# Patient Record
Sex: FEMALE | Race: WHITE | NOT HISPANIC OR LATINO
[De-identification: names, ages, dates, MRNs, and addresses within clinical notes are randomized per-mention and may not be internally consistent; named-entity substitution may affect disease eponyms.]

---

## 2018-11-11 ENCOUNTER — TRANSCRIPTION ENCOUNTER (OUTPATIENT)
Age: 68
End: 2018-11-11

## 2018-11-12 ENCOUNTER — EMERGENCY (EMERGENCY)
Facility: HOSPITAL | Age: 68
LOS: 1 days | Discharge: HOME | End: 2018-11-12
Attending: EMERGENCY MEDICINE
Payer: COMMERCIAL

## 2018-11-12 VITALS
WEIGHT: 130.07 LBS | HEIGHT: 62 IN | DIASTOLIC BLOOD PRESSURE: 91 MMHG | RESPIRATION RATE: 15 BRPM | SYSTOLIC BLOOD PRESSURE: 175 MMHG | HEART RATE: 75 BPM | TEMPERATURE: 98 F | OXYGEN SATURATION: 97 %

## 2018-11-12 VITALS
DIASTOLIC BLOOD PRESSURE: 82 MMHG | SYSTOLIC BLOOD PRESSURE: 141 MMHG | TEMPERATURE: 98 F | OXYGEN SATURATION: 98 % | HEART RATE: 79 BPM | RESPIRATION RATE: 16 BRPM

## 2018-11-12 PROCEDURE — 99285 EMERGENCY DEPT VISIT HI MDM: CPT | Mod: 25

## 2018-11-12 PROCEDURE — 73140 X-RAY EXAM OF FINGER(S): CPT | Mod: 26,LT

## 2018-11-12 PROCEDURE — 14040 TIS TRNFR F/C/C/M/N/A/G/H/F: CPT

## 2018-11-12 PROCEDURE — 26418 REPAIR FINGER TENDON: CPT

## 2018-11-12 PROCEDURE — 73140 X-RAY EXAM OF FINGER(S): CPT

## 2018-11-12 PROCEDURE — 99284 EMERGENCY DEPT VISIT MOD MDM: CPT

## 2018-11-12 RX ORDER — OXYCODONE AND ACETAMINOPHEN 5; 325 MG/1; MG/1
1 TABLET ORAL ONCE
Qty: 0 | Refills: 0 | Status: DISCONTINUED | OUTPATIENT
Start: 2018-11-12 | End: 2018-11-12

## 2018-11-12 RX ADMIN — Medication 1 TABLET(S): at 14:25

## 2018-11-12 RX ADMIN — OXYCODONE AND ACETAMINOPHEN 1 TABLET(S): 5; 325 TABLET ORAL at 14:25

## 2018-11-12 NOTE — ED PROVIDER NOTE - OBJECTIVE STATEMENT
pt is a 66yo female with pmhx of cad s/p stent on asa c/o left hand laceration. pt reports she cut her left hand dorsal aspect 1st digit at work with box cuter. pt tetanus utd. pt right handed.

## 2018-11-12 NOTE — ED PROVIDER NOTE - MUSCULOSKELETAL, MLM
ms lue: from left hand. pt 1st digit in flexion, can extend 1st digit actively. sensation intact. cap refill < 2 seconds tendon appears partially lacerated.

## 2018-11-12 NOTE — ED PROVIDER NOTE - PROGRESS NOTE DETAILS
pt improved. pt seen and evaluated by dr vo plastics, sutured and splinted by plastics. will rx augmentin and percocet for pain. All questions answered and concerns addressed. pt verbalized understanding and agreement with plan and dx. pt advised to follow up with PMD. pt advised to return to ed for worsenng symptoms including fever, cp, sob. will dc.

## 2018-11-12 NOTE — ED ADULT NURSE NOTE - OBJECTIVE STATEMENT
Pt to ED c/o left hand great thumb laceration, occurred while at work in the proximal thumb, tetanus rec'd in 2015

## 2018-11-12 NOTE — CONSULT NOTE ADULT - SUBJECTIVE AND OBJECTIVE BOX
ALEX BROWN  [id number]  [facility] ED    67yFemale, RHD, presents with laceration to the left thumb.  Patient reports mishandling a  resulting in bleeding and pain.  Patient denies numbness in the hand but reports pain with extension.  Patient denies other injuries.    PMHx/PSHx:  MEWS Score  MI (myocardial infarction)  Hand laceration  No significant past surgical history      No Known Allergies      T(C): 36.7 (11-12-18 @ 14:27), Max: 36.7 (11-12-18 @ 14:27)  HR: 79 (11-12-18 @ 14:27) (75 - 79)  BP: 141/82 (11-12-18 @ 14:27) (141/82 - 175/91)  RR: 16 (11-12-18 @ 14:27) (15 - 16)  SpO2: 98% (11-12-18 @ 14:27) (97% - 98%)  NAD  Left Thumb: 2.2cm laceration on thumb deep to subcutaneous layer with necrotic tissue.  +FPL/+OP/+ EPL with weakness.  Cap refill <3s.  +UDN/+RDN in all digits.  Soft compartments.      Xray:   EXAM:  FINGER(S) LEFT HAND                            PROCEDURE DATE:  11/12/2018          INTERPRETATION:  Clinical information: Hand injury.    Technique: AP, lateral, and oblique views of the left hand.    Comparison: None available.    Findings: No acute fractures or dislocations are noted. The imaged joint   spaces and soft tissues are within normal limits.    IMPRESSION: No acute fractures or dislocations.        Procedure:  Left thumb dorsal radial block, digital block.  Washout of wound with betadine.  Excisional debridement skin to subcutaneous layer.  Excisional debridement of extensor tendon.  Repair of extensor tendon with 4.0 nylon.  Skin flaps widely undermined, advanced, repaired with 4.0 nylon.  Antibotic dressing applied with splint.

## 2018-11-12 NOTE — CONSULT NOTE ADULT - ASSESSMENT
A/P: 66 y/o with laceration s/p repair.  - RUE elevation  - Pain control  - Tetanus  - Abx  - Maintain splint  - F/U 7 days  - Patient educated on warning signs to prompt ER return    Thank You  Vini Hamilton MD  Plastic Surgery  [phone]

## 2020-06-15 ENCOUNTER — EMERGENCY (EMERGENCY)
Facility: HOSPITAL | Age: 70
LOS: 1 days | Discharge: HOME | End: 2020-06-15
Attending: STUDENT IN AN ORGANIZED HEALTH CARE EDUCATION/TRAINING PROGRAM
Payer: COMMERCIAL

## 2020-06-15 VITALS
WEIGHT: 139.99 LBS | OXYGEN SATURATION: 98 % | DIASTOLIC BLOOD PRESSURE: 75 MMHG | RESPIRATION RATE: 19 BRPM | HEIGHT: 62 IN | TEMPERATURE: 98 F | SYSTOLIC BLOOD PRESSURE: 163 MMHG | HEART RATE: 64 BPM

## 2020-06-15 PROCEDURE — 74176 CT ABD & PELVIS W/O CONTRAST: CPT | Mod: 26

## 2020-06-15 PROCEDURE — 99285 EMERGENCY DEPT VISIT HI MDM: CPT

## 2020-06-15 RX ORDER — KETOROLAC TROMETHAMINE 30 MG/ML
15 SYRINGE (ML) INJECTION ONCE
Refills: 0 | Status: DISCONTINUED | OUTPATIENT
Start: 2020-06-15 | End: 2020-06-15

## 2020-06-15 RX ORDER — ONDANSETRON 8 MG/1
4 TABLET, FILM COATED ORAL ONCE
Refills: 0 | Status: COMPLETED | OUTPATIENT
Start: 2020-06-15 | End: 2020-06-15

## 2020-06-15 NOTE — ED PROVIDER NOTE - CLINICAL SUMMARY MEDICAL DECISION MAKING FREE TEXT BOX
pt well appearing, VSS, afebrile, with small kidney stone in which expectant management is appropriate, Will checkl renal function and for signs of infected stone.

## 2020-06-15 NOTE — ED PROVIDER NOTE - NSFOLLOWUPINSTRUCTIONS_ED_ALL_ED_FT
1) Follow up with your doctor within one week  2) Return to the ER for worsening or concerning symptoms    Renal Colic    WHAT YOU NEED TO KNOW:    Renal colic is severe pain in your lower back or sides. The pain is usually on one side, but may be on both sides of your lower back. Renal colic may start quickly, come and go, and become worse over time. Renal colic is caused by a blockage in your urinary tract. The most common cause of a blockage is a kidney stone. Blood clots, ureter spasms, and dead tissue may also block your urinary tract.    DISCHARGE INSTRUCTIONS:    Return to the emergency department if:     You cannot stop vomiting.      You see new or increased bleeding when you urinate.      You are urinating less than usual, or not at all.      Your pain is not getting better even after treatment.    Contact your healthcare provider if:     You have fever.      You need to urinate more often than usual, or right away.      You see a stone in your urine strainer after you urinate.      You have questions or concerns about your condition or care.    Medicines:     Medicines may help decrease pain and muscle spasms. You may also need medicine to calm your stomach and stop vomiting.      Take your medicine as directed. Contact your healthcare provider if you think your medicine is not helping or if you have side effects. Tell him of her if you are allergic to any medicine. Keep a list of the medicines, vitamins, and herbs you take. Include the amounts, and when and why you take them. Bring the list or the pill bottles to follow-up visits. Carry your medicine list with you in case of an emergency.    Manage your symptoms:     Drink liquids as directed to help decrease pain and flush blockages from your urinary tract. Ask how much liquid to drink each day and which liquids are best for you. You may need to drink about 3 liters (12 glasses) of liquids each day. Half of your total daily liquids should be water. Limit coffee, tea, and soda to 2 cups daily. Your urine should be pale and clear.      Strain your urine every time you urinate. Urinate into a strainer (funnel with a fine mesh on the bottom) or glass jar to collect kidney stones. Give the kidney stones to your healthcare provider at your next visit.Look for Stones in the Filter           Eat a variety of healthy foods. Healthy foods include fruits, vegetables, whole-grain breads, low-fat dairy products, beans, lean meats, and fish. You may need to increase the amount of citrus fruit you eat, such as oranges. Ask your healthcare provider how much salt, calcium, and protein you should eat.      Avoid activity in hot temperatures. Heat may cause you to become dehydrated and urinate less.    Follow up with your healthcare provider as directed: You may need to return for tests to check if your blockage has cleared. Write down your questions so you remember to ask them during your visits

## 2020-06-15 NOTE — ED PROVIDER NOTE - OBJECTIVE STATEMENT
70 y/o female with PMHx of MI, and Kidney stones presents to ED c/o flank pain. Patient reports left flank pain, that started about 2 hours ago, that was constant, but has some relief from the pain now. Had 1 episode of vomiting since pain started.     Denies diarrhea, fevers, blood in vomit

## 2020-06-15 NOTE — ED PROVIDER NOTE - CARE PROVIDER_API CALL
Blair Hernandez J  UROLOGY  [address] [place] [address]  Phone: [phone]  Fax: [phone]  Follow Up Time:

## 2020-06-15 NOTE — ED PROVIDER NOTE - PATIENT PORTAL LINK FT
You can access the FollowMyHealth Patient Portal offered by [facility] by registering at the following website: [email or web address]. By joining [facility]’s FollowMyHealth portal, you will also be able to view your health information using other applications (apps) compatible with our system.

## 2020-06-16 VITALS
RESPIRATION RATE: 18 BRPM | OXYGEN SATURATION: 99 % | DIASTOLIC BLOOD PRESSURE: 85 MMHG | SYSTOLIC BLOOD PRESSURE: 174 MMHG | HEART RATE: 65 BPM | TEMPERATURE: 98 F

## 2020-06-16 LAB
ALBUMIN SERPL ELPH-MCNC: 4.1 G/DL — SIGNIFICANT CHANGE UP (ref 3.3–5.2)
ALP SERPL-CCNC: 72 U/L — SIGNIFICANT CHANGE UP (ref 40–120)
ALT FLD-CCNC: 16 U/L — SIGNIFICANT CHANGE UP
ANION GAP SERPL CALC-SCNC: 7 MMOL/L — SIGNIFICANT CHANGE UP (ref 5–17)
APPEARANCE UR: CLEAR — SIGNIFICANT CHANGE UP
AST SERPL-CCNC: 20 U/L — SIGNIFICANT CHANGE UP
BACTERIA # UR AUTO: ABNORMAL
BASOPHILS # BLD AUTO: 0.02 K/UL — SIGNIFICANT CHANGE UP (ref 0–0.2)
BASOPHILS NFR BLD AUTO: 0.2 % — SIGNIFICANT CHANGE UP (ref 0–2)
BILIRUB SERPL-MCNC: 0.3 MG/DL — LOW (ref 0.4–2)
BILIRUB UR-MCNC: NEGATIVE — SIGNIFICANT CHANGE UP
BUN SERPL-MCNC: 20 MG/DL — SIGNIFICANT CHANGE UP (ref 8–20)
CALCIUM SERPL-MCNC: 9 MG/DL — SIGNIFICANT CHANGE UP (ref 8.6–10.2)
CHLORIDE SERPL-SCNC: 108 MMOL/L — HIGH (ref 98–107)
CO2 SERPL-SCNC: 26 MMOL/L — SIGNIFICANT CHANGE UP (ref 22–29)
COLOR SPEC: YELLOW — SIGNIFICANT CHANGE UP
CREAT SERPL-MCNC: 0.63 MG/DL — SIGNIFICANT CHANGE UP (ref 0.5–1.3)
DIFF PNL FLD: ABNORMAL
EOSINOPHIL # BLD AUTO: 0.09 K/UL — SIGNIFICANT CHANGE UP (ref 0–0.5)
EOSINOPHIL NFR BLD AUTO: 1 % — SIGNIFICANT CHANGE UP (ref 0–6)
EPI CELLS # UR: SIGNIFICANT CHANGE UP
GLUCOSE SERPL-MCNC: 111 MG/DL — HIGH (ref 70–99)
GLUCOSE UR QL: NEGATIVE MG/DL — SIGNIFICANT CHANGE UP
HCT VFR BLD CALC: 40.6 % — SIGNIFICANT CHANGE UP (ref 34.5–45)
HGB BLD-MCNC: 13.4 G/DL — SIGNIFICANT CHANGE UP (ref 11.5–15.5)
IMM GRANULOCYTES NFR BLD AUTO: 0.2 % — SIGNIFICANT CHANGE UP (ref 0–1.5)
KETONES UR-MCNC: NEGATIVE — SIGNIFICANT CHANGE UP
LEUKOCYTE ESTERASE UR-ACNC: ABNORMAL
LYMPHOCYTES # BLD AUTO: 0.98 K/UL — LOW (ref 1–3.3)
LYMPHOCYTES # BLD AUTO: 11.1 % — LOW (ref 13–44)
MCHC RBC-ENTMCNC: 31.8 PG — SIGNIFICANT CHANGE UP (ref 27–34)
MCHC RBC-ENTMCNC: 33 GM/DL — SIGNIFICANT CHANGE UP (ref 32–36)
MCV RBC AUTO: 96.2 FL — SIGNIFICANT CHANGE UP (ref 80–100)
MONOCYTES # BLD AUTO: 0.47 K/UL — SIGNIFICANT CHANGE UP (ref 0–0.9)
MONOCYTES NFR BLD AUTO: 5.3 % — SIGNIFICANT CHANGE UP (ref 2–14)
NEUTROPHILS # BLD AUTO: 7.24 K/UL — SIGNIFICANT CHANGE UP (ref 1.8–7.4)
NEUTROPHILS NFR BLD AUTO: 82.2 % — HIGH (ref 43–77)
NITRITE UR-MCNC: NEGATIVE — SIGNIFICANT CHANGE UP
PH UR: 6.5 — SIGNIFICANT CHANGE UP (ref 5–8)
PLATELET # BLD AUTO: 151 K/UL — SIGNIFICANT CHANGE UP (ref 150–400)
POTASSIUM SERPL-MCNC: 3.6 MMOL/L — SIGNIFICANT CHANGE UP (ref 3.5–5.3)
POTASSIUM SERPL-SCNC: 3.6 MMOL/L — SIGNIFICANT CHANGE UP (ref 3.5–5.3)
PROT SERPL-MCNC: 6.5 G/DL — LOW (ref 6.6–8.7)
PROT UR-MCNC: 30 MG/DL
RBC # BLD: 4.22 M/UL — SIGNIFICANT CHANGE UP (ref 3.8–5.2)
RBC # FLD: 12.4 % — SIGNIFICANT CHANGE UP (ref 10.3–14.5)
RBC CASTS # UR COMP ASSIST: ABNORMAL /HPF (ref 0–4)
SODIUM SERPL-SCNC: 141 MMOL/L — SIGNIFICANT CHANGE UP (ref 135–145)
SP GR SPEC: 1.01 — SIGNIFICANT CHANGE UP (ref 1.01–1.02)
UROBILINOGEN FLD QL: NEGATIVE MG/DL — SIGNIFICANT CHANGE UP
WBC # BLD: 8.82 K/UL — SIGNIFICANT CHANGE UP (ref 3.8–10.5)
WBC # FLD AUTO: 8.82 K/UL — SIGNIFICANT CHANGE UP (ref 3.8–10.5)
WBC UR QL: SIGNIFICANT CHANGE UP

## 2020-06-16 PROCEDURE — 74176 CT ABD & PELVIS W/O CONTRAST: CPT

## 2020-06-16 PROCEDURE — 93010 ELECTROCARDIOGRAM REPORT: CPT

## 2020-06-16 PROCEDURE — 93005 ELECTROCARDIOGRAM TRACING: CPT

## 2020-06-16 PROCEDURE — 99284 EMERGENCY DEPT VISIT MOD MDM: CPT | Mod: 25

## 2020-06-16 PROCEDURE — 85027 COMPLETE CBC AUTOMATED: CPT

## 2020-06-16 PROCEDURE — 36415 COLL VENOUS BLD VENIPUNCTURE: CPT

## 2020-06-16 PROCEDURE — 84484 ASSAY OF TROPONIN QUANT: CPT

## 2020-06-16 PROCEDURE — 96374 THER/PROPH/DIAG INJ IV PUSH: CPT

## 2020-06-16 PROCEDURE — 80053 COMPREHEN METABOLIC PANEL: CPT

## 2020-06-16 PROCEDURE — 81001 URINALYSIS AUTO W/SCOPE: CPT

## 2020-06-16 RX ORDER — TAMSULOSIN HYDROCHLORIDE 0.4 MG/1
1 CAPSULE ORAL
Qty: 10 | Refills: 0
Start: 2020-06-16 | End: 2020-06-25

## 2020-06-16 RX ORDER — IBUPROFEN 200 MG
1 TABLET ORAL
Qty: 20 | Refills: 0
Start: 2020-06-16 | End: 2020-06-20

## 2020-06-16 RX ADMIN — Medication 15 MILLIGRAM(S): at 00:12

## 2020-06-16 NOTE — ED ADULT NURSE NOTE - CHIEF COMPLAINT QUOTE
Patient presented to ed secondary to left flank pain. AS per patient she has HX of renal stones had experience same pain in past. Denies blood in urine and any trauma to effected area.

## 2020-06-16 NOTE — ED ADULT NURSE NOTE - OBJECTIVE STATEMENT
patient presents with c/o left side flank pain getting worse over the last few hours, accompanied by 1 episode of nausea/vomitng at home PTA. hx of kidney stones. denies blood in urine, diff. urinating or pain with urination. no fever/chills, abd pain, chest pain, cough, sob.

## 2020-08-05 ENCOUNTER — EMERGENCY (EMERGENCY)
Facility: HOSPITAL | Age: 70
LOS: 1 days | End: 2020-08-05
Attending: STUDENT IN AN ORGANIZED HEALTH CARE EDUCATION/TRAINING PROGRAM
Payer: COMMERCIAL

## 2020-08-05 VITALS
OXYGEN SATURATION: 98 % | RESPIRATION RATE: 14 BRPM | HEART RATE: 74 BPM | WEIGHT: 149.91 LBS | DIASTOLIC BLOOD PRESSURE: 86 MMHG | TEMPERATURE: 98 F | SYSTOLIC BLOOD PRESSURE: 137 MMHG

## 2020-08-05 VITALS
SYSTOLIC BLOOD PRESSURE: 155 MMHG | TEMPERATURE: 98 F | OXYGEN SATURATION: 97 % | HEART RATE: 72 BPM | RESPIRATION RATE: 17 BRPM | DIASTOLIC BLOOD PRESSURE: 79 MMHG

## 2020-08-05 PROBLEM — N20.0 CALCULUS OF KIDNEY: Chronic | Status: ACTIVE | Noted: 2020-06-16

## 2020-08-05 LAB
ALBUMIN SERPL ELPH-MCNC: 4.1 G/DL — SIGNIFICANT CHANGE UP (ref 3.3–5)
ALP SERPL-CCNC: 74 U/L — SIGNIFICANT CHANGE UP (ref 40–120)
ALT FLD-CCNC: 20 U/L — SIGNIFICANT CHANGE UP (ref 12–78)
ANION GAP SERPL CALC-SCNC: 4 MMOL/L — LOW (ref 5–17)
APPEARANCE UR: CLEAR — SIGNIFICANT CHANGE UP
AST SERPL-CCNC: 19 U/L — SIGNIFICANT CHANGE UP (ref 15–37)
BACTERIA # UR AUTO: ABNORMAL
BASOPHILS # BLD AUTO: 0.01 K/UL — SIGNIFICANT CHANGE UP (ref 0–0.2)
BASOPHILS NFR BLD AUTO: 0.1 % — SIGNIFICANT CHANGE UP (ref 0–2)
BILIRUB SERPL-MCNC: 0.7 MG/DL — SIGNIFICANT CHANGE UP (ref 0.2–1.2)
BILIRUB UR-MCNC: NEGATIVE — SIGNIFICANT CHANGE UP
BUN SERPL-MCNC: 21 MG/DL — SIGNIFICANT CHANGE UP (ref 7–23)
CALCIUM SERPL-MCNC: 9.1 MG/DL — SIGNIFICANT CHANGE UP (ref 8.5–10.1)
CHLORIDE SERPL-SCNC: 112 MMOL/L — HIGH (ref 96–108)
CO2 SERPL-SCNC: 27 MMOL/L — SIGNIFICANT CHANGE UP (ref 22–31)
COLOR SPEC: SIGNIFICANT CHANGE UP
CREAT SERPL-MCNC: 0.91 MG/DL — SIGNIFICANT CHANGE UP (ref 0.5–1.3)
DIFF PNL FLD: ABNORMAL
EOSINOPHIL # BLD AUTO: 0.02 K/UL — SIGNIFICANT CHANGE UP (ref 0–0.5)
EOSINOPHIL NFR BLD AUTO: 0.3 % — SIGNIFICANT CHANGE UP (ref 0–6)
EPI CELLS # UR: SIGNIFICANT CHANGE UP
GLUCOSE SERPL-MCNC: 116 MG/DL — HIGH (ref 70–99)
GLUCOSE UR QL: NEGATIVE — SIGNIFICANT CHANGE UP
HCT VFR BLD CALC: 39 % — SIGNIFICANT CHANGE UP (ref 34.5–45)
HGB BLD-MCNC: 13.4 G/DL — SIGNIFICANT CHANGE UP (ref 11.5–15.5)
IMM GRANULOCYTES NFR BLD AUTO: 0.3 % — SIGNIFICANT CHANGE UP (ref 0–1.5)
KETONES UR-MCNC: NEGATIVE — SIGNIFICANT CHANGE UP
LEUKOCYTE ESTERASE UR-ACNC: NEGATIVE — SIGNIFICANT CHANGE UP
LYMPHOCYTES # BLD AUTO: 0.56 K/UL — LOW (ref 1–3.3)
LYMPHOCYTES # BLD AUTO: 7.1 % — LOW (ref 13–44)
MANUAL SMEAR VERIFICATION: SIGNIFICANT CHANGE UP
MCHC RBC-ENTMCNC: 31.1 PG — SIGNIFICANT CHANGE UP (ref 27–34)
MCHC RBC-ENTMCNC: 34.4 GM/DL — SIGNIFICANT CHANGE UP (ref 32–36)
MCV RBC AUTO: 90.5 FL — SIGNIFICANT CHANGE UP (ref 80–100)
MONOCYTES # BLD AUTO: 0.28 K/UL — SIGNIFICANT CHANGE UP (ref 0–0.9)
MONOCYTES NFR BLD AUTO: 3.6 % — SIGNIFICANT CHANGE UP (ref 2–14)
NEUTROPHILS # BLD AUTO: 6.96 K/UL — SIGNIFICANT CHANGE UP (ref 1.8–7.4)
NEUTROPHILS NFR BLD AUTO: 88.6 % — HIGH (ref 43–77)
NITRITE UR-MCNC: NEGATIVE — SIGNIFICANT CHANGE UP
NRBC # BLD: 0 /100 WBCS — SIGNIFICANT CHANGE UP (ref 0–0)
PH UR: 8 — SIGNIFICANT CHANGE UP (ref 5–8)
PLAT MORPH BLD: NORMAL — SIGNIFICANT CHANGE UP
PLATELET # BLD AUTO: 121 K/UL — LOW (ref 150–400)
POTASSIUM SERPL-MCNC: 4.2 MMOL/L — SIGNIFICANT CHANGE UP (ref 3.5–5.3)
POTASSIUM SERPL-SCNC: 4.2 MMOL/L — SIGNIFICANT CHANGE UP (ref 3.5–5.3)
PROT SERPL-MCNC: 7.4 G/DL — SIGNIFICANT CHANGE UP (ref 6–8.3)
PROT UR-MCNC: NEGATIVE — SIGNIFICANT CHANGE UP
RBC # BLD: 4.31 M/UL — SIGNIFICANT CHANGE UP (ref 3.8–5.2)
RBC # FLD: 12.2 % — SIGNIFICANT CHANGE UP (ref 10.3–14.5)
RBC BLD AUTO: SIGNIFICANT CHANGE UP
RBC CASTS # UR COMP ASSIST: SIGNIFICANT CHANGE UP /HPF (ref 0–4)
SODIUM SERPL-SCNC: 143 MMOL/L — SIGNIFICANT CHANGE UP (ref 135–145)
SP GR SPEC: 1.01 — SIGNIFICANT CHANGE UP (ref 1.01–1.02)
UROBILINOGEN FLD QL: NEGATIVE — SIGNIFICANT CHANGE UP
WBC # BLD: 7.85 K/UL — SIGNIFICANT CHANGE UP (ref 3.8–10.5)
WBC # FLD AUTO: 7.85 K/UL — SIGNIFICANT CHANGE UP (ref 3.8–10.5)
WBC UR QL: SIGNIFICANT CHANGE UP

## 2020-08-05 PROCEDURE — 85027 COMPLETE CBC AUTOMATED: CPT

## 2020-08-05 PROCEDURE — 87086 URINE CULTURE/COLONY COUNT: CPT

## 2020-08-05 PROCEDURE — 96374 THER/PROPH/DIAG INJ IV PUSH: CPT | Mod: XU

## 2020-08-05 PROCEDURE — 74177 CT ABD & PELVIS W/CONTRAST: CPT

## 2020-08-05 PROCEDURE — 36415 COLL VENOUS BLD VENIPUNCTURE: CPT

## 2020-08-05 PROCEDURE — 80053 COMPREHEN METABOLIC PANEL: CPT

## 2020-08-05 PROCEDURE — 99284 EMERGENCY DEPT VISIT MOD MDM: CPT

## 2020-08-05 PROCEDURE — 99284 EMERGENCY DEPT VISIT MOD MDM: CPT | Mod: 25

## 2020-08-05 PROCEDURE — 74177 CT ABD & PELVIS W/CONTRAST: CPT | Mod: 26

## 2020-08-05 PROCEDURE — 96375 TX/PRO/DX INJ NEW DRUG ADDON: CPT

## 2020-08-05 PROCEDURE — 81001 URINALYSIS AUTO W/SCOPE: CPT

## 2020-08-05 RX ORDER — ONDANSETRON 8 MG/1
4 TABLET, FILM COATED ORAL ONCE
Refills: 0 | Status: COMPLETED | OUTPATIENT
Start: 2020-08-05 | End: 2020-08-05

## 2020-08-05 RX ORDER — CEFUROXIME AXETIL 250 MG
1 TABLET ORAL
Qty: 14 | Refills: 0
Start: 2020-08-05 | End: 2020-08-11

## 2020-08-05 RX ORDER — SODIUM CHLORIDE 9 MG/ML
1000 INJECTION INTRAMUSCULAR; INTRAVENOUS; SUBCUTANEOUS ONCE
Refills: 0 | Status: COMPLETED | OUTPATIENT
Start: 2020-08-05 | End: 2020-08-05

## 2020-08-05 RX ORDER — MORPHINE SULFATE 50 MG/1
2 CAPSULE, EXTENDED RELEASE ORAL ONCE
Refills: 0 | Status: DISCONTINUED | OUTPATIENT
Start: 2020-08-05 | End: 2020-08-05

## 2020-08-05 RX ORDER — KETOROLAC TROMETHAMINE 30 MG/ML
15 SYRINGE (ML) INJECTION ONCE
Refills: 0 | Status: DISCONTINUED | OUTPATIENT
Start: 2020-08-05 | End: 2020-08-05

## 2020-08-05 RX ADMIN — SODIUM CHLORIDE 1000 MILLILITER(S): 9 INJECTION INTRAMUSCULAR; INTRAVENOUS; SUBCUTANEOUS at 12:25

## 2020-08-05 RX ADMIN — MORPHINE SULFATE 2 MILLIGRAM(S): 50 CAPSULE, EXTENDED RELEASE ORAL at 11:40

## 2020-08-05 RX ADMIN — MORPHINE SULFATE 2 MILLIGRAM(S): 50 CAPSULE, EXTENDED RELEASE ORAL at 11:25

## 2020-08-05 RX ADMIN — SODIUM CHLORIDE 1000 MILLILITER(S): 9 INJECTION INTRAMUSCULAR; INTRAVENOUS; SUBCUTANEOUS at 11:25

## 2020-08-05 RX ADMIN — Medication 15 MILLIGRAM(S): at 13:47

## 2020-08-05 RX ADMIN — ONDANSETRON 4 MILLIGRAM(S): 8 TABLET, FILM COATED ORAL at 11:25

## 2020-08-05 NOTE — ED PROVIDER NOTE - PHYSICAL EXAMINATION
Gen: Well appearing in NAD  Head: NC/AT  Neck: trachea midline  Resp:  No distress  abd: + llq tenderness  no CVAT  Ext: no deformities  Neuro:  A&O appears non focal  Skin:  Warm and dry as visualized  Psych:  Normal affect and mood

## 2020-08-05 NOTE — ED PROVIDER NOTE - OBJECTIVE STATEMENT
68 y/o female with PMHx of MI, and Kidney stones presents to ED c/o llq abd pain, started this morning, worse than her prior stone, but does not recall where her pain was in prior stone. + nausea and vomiting, no fevers. no dairrhea, no urinary sx

## 2020-08-05 NOTE — ED PROVIDER NOTE - NSFOLLOWUPINSTRUCTIONS_ED_ALL_ED_FT
1. TAKE ALL MEDICATIONS AS DIRECTED.    2. FOR PAIN OR FEVER YOU CAN TAKE IBUPROFEN (MOTRIN, ADVIL) OR ACETAMINOPHEN (TYLENOL) AS NEEDED, AS DIRECTED ON PACKAGING.  3. FOLLOW UP WITH YOUR PRIMARY DOCTOR WITHIN 5 DAYS AS DIRECTED.  4. IF YOU HAD LABS OR IMAGING DONE, YOU WERE GIVEN COPIES OF ALL LABS AND/OR IMAGING RESULTS FROM YOUR ER VISIT--PLEASE TAKE THEM WITH YOU TO YOUR FOLLOW UP APPOINTMENTS.  5. IF NEEDED, CALL PATIENT ACCESS SERVICES AT [phone] TO FIND A PRIMARY CARE PHYSICIAN.  OR CALL [phone] TO MAKE AN APPOINTMENT WITH THE CLINIC.  6. RETURN TO THE ER FOR ANY WORSENING SYMPTOMS OR CONCERNS.    FOllow up with urology. Take antibiotics twice a day as directed, return for fevers, or worsening symtpoms   Please see your PCP regarding pulmonary nodules.

## 2020-08-05 NOTE — ED PROVIDER NOTE - CLINICAL SUMMARY MEDICAL DECISION MAKING FREE TEXT BOX
68yo F with llq pain, ct with 3mm distal stone and hydro, no UTI, willd cw tih uro follow up and ceftin

## 2020-08-05 NOTE — ED PROVIDER NOTE - PATIENT PORTAL LINK FT
Called pateint about labs - all 'rheum' labs were nml x small elevation of CRP. I explained that this is very non specific. She is c/o pain in multiple joints and ankle. I suggest a rheumatology consult.   You can access the FollowMyHealth Patient Portal offered by [facility] by registering at the following website: [email or web address]. By joining [facility]’s FollowMyHealth portal, you will also be able to view your health information using other applications (apps) compatible with our system.

## 2020-08-05 NOTE — ED PROVIDER NOTE - CARE PROVIDER_API CALL
Jb Sapp  UROLOGY  [address] [place] [address]  Phone: [phone]  Fax: [phone]  Follow Up Time: 4-6 Days

## 2020-08-05 NOTE — ED ADULT NURSE NOTE - OBJECTIVE STATEMENT
patient states she has been feeling stabbing abdominal pain to the left lower quadrant. patient states she has been feeling stabbing abdominal pain to the left lower quadrant since last night.  patient states she was sweating this morning and is unsure if she had a fever.  patient states she vomited this morning/early afternoon

## 2020-08-06 ENCOUNTER — INPATIENT (INPATIENT)
Facility: HOSPITAL | Age: 70
LOS: 0 days | Discharge: HOME | DRG: 884 | End: 2020-08-07
Attending: FAMILY MEDICINE | Admitting: HOSPITALIST
Payer: COMMERCIAL

## 2020-08-06 VITALS
SYSTOLIC BLOOD PRESSURE: 177 MMHG | HEART RATE: 73 BPM | RESPIRATION RATE: 18 BRPM | HEIGHT: 65 IN | WEIGHT: 130.07 LBS | TEMPERATURE: 98 F | OXYGEN SATURATION: 97 % | DIASTOLIC BLOOD PRESSURE: 83 MMHG

## 2020-08-06 DIAGNOSIS — R47.01 APHASIA: ICD-10-CM

## 2020-08-06 LAB
ALBUMIN SERPL ELPH-MCNC: 3.7 G/DL — SIGNIFICANT CHANGE UP (ref 3.3–5)
ALP SERPL-CCNC: 70 U/L — SIGNIFICANT CHANGE UP (ref 40–120)
ALT FLD-CCNC: 19 U/L — SIGNIFICANT CHANGE UP (ref 12–78)
ANION GAP SERPL CALC-SCNC: 3 MMOL/L — LOW (ref 5–17)
APPEARANCE UR: CLEAR — SIGNIFICANT CHANGE UP
APTT BLD: 32.3 SEC — SIGNIFICANT CHANGE UP (ref 27.5–35.5)
AST SERPL-CCNC: 20 U/L — SIGNIFICANT CHANGE UP (ref 15–37)
BASOPHILS # BLD AUTO: 0.01 K/UL — SIGNIFICANT CHANGE UP (ref 0–0.2)
BASOPHILS NFR BLD AUTO: 0.2 % — SIGNIFICANT CHANGE UP (ref 0–2)
BILIRUB SERPL-MCNC: 0.6 MG/DL — SIGNIFICANT CHANGE UP (ref 0.2–1.2)
BILIRUB UR-MCNC: NEGATIVE — SIGNIFICANT CHANGE UP
BUN SERPL-MCNC: 17 MG/DL — SIGNIFICANT CHANGE UP (ref 7–23)
CALCIUM SERPL-MCNC: 9.1 MG/DL — SIGNIFICANT CHANGE UP (ref 8.5–10.1)
CHLORIDE SERPL-SCNC: 110 MMOL/L — HIGH (ref 96–108)
CO2 SERPL-SCNC: 30 MMOL/L — SIGNIFICANT CHANGE UP (ref 22–31)
COLOR SPEC: YELLOW — SIGNIFICANT CHANGE UP
CREAT SERPL-MCNC: 0.66 MG/DL — SIGNIFICANT CHANGE UP (ref 0.5–1.3)
CULTURE RESULTS: SIGNIFICANT CHANGE UP
DIFF PNL FLD: ABNORMAL
EOSINOPHIL # BLD AUTO: 0.12 K/UL — SIGNIFICANT CHANGE UP (ref 0–0.5)
EOSINOPHIL NFR BLD AUTO: 2.3 % — SIGNIFICANT CHANGE UP (ref 0–6)
GLUCOSE SERPL-MCNC: 143 MG/DL — HIGH (ref 70–99)
GLUCOSE UR QL: NEGATIVE — SIGNIFICANT CHANGE UP
HCT VFR BLD CALC: 36.9 % — SIGNIFICANT CHANGE UP (ref 34.5–45)
HGB BLD-MCNC: 12.3 G/DL — SIGNIFICANT CHANGE UP (ref 11.5–15.5)
IMM GRANULOCYTES NFR BLD AUTO: 0.2 % — SIGNIFICANT CHANGE UP (ref 0–1.5)
INR BLD: 1.05 RATIO — SIGNIFICANT CHANGE UP (ref 0.88–1.16)
KETONES UR-MCNC: NEGATIVE — SIGNIFICANT CHANGE UP
LEUKOCYTE ESTERASE UR-ACNC: ABNORMAL
LYMPHOCYTES # BLD AUTO: 1.26 K/UL — SIGNIFICANT CHANGE UP (ref 1–3.3)
LYMPHOCYTES # BLD AUTO: 24.1 % — SIGNIFICANT CHANGE UP (ref 13–44)
MCHC RBC-ENTMCNC: 31.1 PG — SIGNIFICANT CHANGE UP (ref 27–34)
MCHC RBC-ENTMCNC: 33.3 GM/DL — SIGNIFICANT CHANGE UP (ref 32–36)
MCV RBC AUTO: 93.4 FL — SIGNIFICANT CHANGE UP (ref 80–100)
MONOCYTES # BLD AUTO: 0.38 K/UL — SIGNIFICANT CHANGE UP (ref 0–0.9)
MONOCYTES NFR BLD AUTO: 7.3 % — SIGNIFICANT CHANGE UP (ref 2–14)
NEUTROPHILS # BLD AUTO: 3.44 K/UL — SIGNIFICANT CHANGE UP (ref 1.8–7.4)
NEUTROPHILS NFR BLD AUTO: 65.9 % — SIGNIFICANT CHANGE UP (ref 43–77)
NITRITE UR-MCNC: NEGATIVE — SIGNIFICANT CHANGE UP
NRBC # BLD: 0 /100 WBCS — SIGNIFICANT CHANGE UP (ref 0–0)
PH UR: 5 — SIGNIFICANT CHANGE UP (ref 5–8)
PLATELET # BLD AUTO: 125 K/UL — LOW (ref 150–400)
POTASSIUM SERPL-MCNC: 4 MMOL/L — SIGNIFICANT CHANGE UP (ref 3.5–5.3)
POTASSIUM SERPL-SCNC: 4 MMOL/L — SIGNIFICANT CHANGE UP (ref 3.5–5.3)
PROT SERPL-MCNC: 7.1 G/DL — SIGNIFICANT CHANGE UP (ref 6–8.3)
PROT UR-MCNC: NEGATIVE — SIGNIFICANT CHANGE UP
PROTHROM AB SERPL-ACNC: 12.3 SEC — SIGNIFICANT CHANGE UP (ref 10.6–13.6)
RBC # BLD: 3.95 M/UL — SIGNIFICANT CHANGE UP (ref 3.8–5.2)
RBC # FLD: 12.4 % — SIGNIFICANT CHANGE UP (ref 10.3–14.5)
SODIUM SERPL-SCNC: 143 MMOL/L — SIGNIFICANT CHANGE UP (ref 135–145)
SP GR SPEC: 1.01 — SIGNIFICANT CHANGE UP (ref 1.01–1.02)
SPECIMEN SOURCE: SIGNIFICANT CHANGE UP
TROPONIN I SERPL-MCNC: <.015 NG/ML — SIGNIFICANT CHANGE UP (ref 0.01–0.04)
UROBILINOGEN FLD QL: NEGATIVE — SIGNIFICANT CHANGE UP
WBC # BLD: 5.22 K/UL — SIGNIFICANT CHANGE UP (ref 3.8–10.5)
WBC # FLD AUTO: 5.22 K/UL — SIGNIFICANT CHANGE UP (ref 3.8–10.5)

## 2020-08-06 PROCEDURE — 93010 ELECTROCARDIOGRAM REPORT: CPT

## 2020-08-06 PROCEDURE — 99285 EMERGENCY DEPT VISIT HI MDM: CPT

## 2020-08-06 PROCEDURE — 70498 CT ANGIOGRAPHY NECK: CPT | Mod: 26

## 2020-08-06 PROCEDURE — 99223 1ST HOSP IP/OBS HIGH 75: CPT | Mod: GC

## 2020-08-06 PROCEDURE — 70496 CT ANGIOGRAPHY HEAD: CPT | Mod: 26

## 2020-08-06 RX ORDER — ASPIRIN/CALCIUM CARB/MAGNESIUM 324 MG
325 TABLET ORAL ONCE
Refills: 0 | Status: COMPLETED | OUTPATIENT
Start: 2020-08-06 | End: 2020-08-06

## 2020-08-06 RX ADMIN — Medication 325 MILLIGRAM(S): at 23:42

## 2020-08-06 NOTE — ED PROVIDER NOTE - OBJECTIVE STATEMENT
68yo woman pw episode of AMS today while on the phone with her PMD to ask a question. she was here in the ED yesterday for stone and treated for UTI with abx in addition.  son at bedside states she intermittently has episodes of aphasia and asking unrelated questions, appearing confused but has never had a workup for those symptoms.  pt aao x3 here at this time, states she feels well, without complaint. no HA.  denies weakness, numbness.

## 2020-08-06 NOTE — ED PROVIDER NOTE - CLINICAL SUMMARY MEDICAL DECISION MAKING FREE TEXT BOX
intermittent episodes of aphasia and confusion, back to baseline her in ED. workup in ED benign.  admit to medicine for further eval.

## 2020-08-06 NOTE — H&P ADULT - PROBLEM SELECTOR PLAN 4
Remote history of MI, Home medications include ASA 81 mg , Metoprolol , Ramipril and Crestor 20   - In setting of possible CVA, increased ASA to 325 mg   - Ordered Lisinopril 20  (therapeutic interchange) for Ramipril  - Ordered Atorvastatin 80 (therapeutic interchange) for Crestor 20  - Metoprolol on hold for Sinus Bradycardia

## 2020-08-06 NOTE — H&P ADULT - PROBLEM SELECTOR PLAN 1
- Rule out CVA for "Aphasia" vs worsening Dementia, unclear chronicity of symptoms, per family 2 years, per son PCP thought aphasia was an acute change in mental status  -  given as patient is on ASA 81 mg at home  - Patient on high dose statin, continue formulary atorvastatin 80  - CT head negative for hemorrhage or mass or midline shift, CT angio : Negative for meaningful stenosis  - Mini Cog exam total : 0 , at this score <3 , likely element of dementia   - Follow up A1c and lipid panel  - Follow up B12 level, TSH  - Monitor on telemetry for arrythmia  - TTE ordered for the AM  - Passed Dysphagia screen with nursing, on DASH diet  - ordered Speech and Swallow for AM , PT ordered for AM  - Consulted Neuro: Dr. Beavers

## 2020-08-06 NOTE — ED ADULT NURSE NOTE - CHIEF COMPLAINT QUOTE
a/ox2 Patient seen here for +kidney stone two days ago and prescribed antiobiotic - MD Galan called stating shes experiencing altered mental status at this time and he would like her to see neuro. Per MD, may be related to antibiotic that was prescribed.

## 2020-08-06 NOTE — H&P ADULT - NSHPPHYSICALEXAM_GEN_ALL_CORE
T(C): 36.8 (08-07-20 @ 01:12), Max: 37 (08-06-20 @ 20:49)  HR: 56 (08-07-20 @ 01:12) (56 - 73)  BP: 169/75 (08-07-20 @ 01:12) (163/81 - 177/83)  RR: 16 (08-07-20 @ 01:12) (16 - 18)  SpO2: 99% (08-07-20 @ 01:12) (96% - 99%)    GENERAL: patient appears well, no acute distress, appropriate, pleasant  EYES: sclera clear, no exudates  ENMT: oropharynx clear without erythema, no exudates, moist mucous membranes  LUNGS: clear to auscultation, symmetric breath sounds, no wheezing or rhonchi appreciated  HEART: S1/S2, regular rate and rhythm, no murmurs noted, no lower extremity edema  GASTROINTESTINAL: abdomen is soft, nontender, nondistended, normoactive bowel sounds  INTEGUMENT: good skin turgor, warm , dry intact  MUSCULOSKELETAL: no clubbing or cyanosis, no obvious deformity  NEUROLOGIC: awake, alert, oriented x 2, does not know year or season, hesitant on age, mild aphasia, difficultly with short term memory.  CN intact II-XII. strength 5/5 UE and LE . no sensory deficits, Serial 7's: completed properly. Unable to spell world backwards. Mini Cog exam:  Word recall: 0 points for word recall and 0 points for ability to draw a clock correctly, NIHSS score: 3   PSYCHIATRIC: mood is good, affect is congruent, linear and logical thought process

## 2020-08-06 NOTE — H&P ADULT - ASSESSMENT
68 yo F PMHx hx of Remote Myocardial Infarction s/p PCI x 4, Anxiety (fearful of men), Multiple kidney stones, now presenting sent from PMD's office for aphasia. Admitted for Aphasia, Rule out CVA

## 2020-08-06 NOTE — H&P ADULT - PROBLEM SELECTOR PLAN 3
- Continue tamsulosin 0.4 qhs as CT from yesterday 's ED visit shows kidney stone 3 mm, though back pain has remitted, likely passed the stone  - Finish antibiotic for 2 more days

## 2020-08-06 NOTE — H&P ADULT - NSHPREVIEWOFSYSTEMS_GEN_ALL_CORE
CONSTITUTIONAL: denies fever, chills, fatigue, weakness  HEENT: denies blurred vision, sore throat  SKIN: denies new lesions, rash  CARDIOVASCULAR: denies chest pain, chest pressure, palpitations  RESPIRATORY: denies shortness of breath, sputum production  GASTROINTESTINAL: denies nausea, vomiting, diarrhea, abdominal pain  GENITOURINARY: denies dysuria, discharge  NEUROLOGICAL: denies numbness, headache, focal weakness  MUSCULOSKELETAL: denies new joint pain, muscle aches  HEMATOLOGIC: denies gross bleeding, bruising  LYMPHATICS: denies enlarged lymph nodes, extremity swelling  PSYCHIATRIC: denies recent changes in anxiety, depression

## 2020-08-06 NOTE — ED ADULT NURSE NOTE - OBJECTIVE STATEMENT
Pt received in bed alert and oriented and pleasantly confuse and resting in bed. Pt has delayed response, but then answers appropriately. As per pt's son MD Galan called stating that he tried having a conversation with pt and that pt was experiencing altered mental status at the time.  As per pt's son pt was always slightly confuse but that her Md thinks she is more altered and it may be related to antibiotic that was prescribed. As per Md's orders IV kyree placed blood specimen obtained and sent to the lab. Pt stable and nursing care ongoing and safety maintained. Pt's son at bedside.

## 2020-08-06 NOTE — H&P ADULT - PROBLEM SELECTOR PLAN 2
Possibly Medication induced bradycardia due to  Metoprolol succ 100 mg   - Per son, no known history of arrythmia, Prior EKG from June 16 2020 (ED for kidney stone) showed NSR @ 63   - Hold Metoprolol for now  - Monitor bradyarrhythmia on telemetry  - Consulted Cardio; [facility] Dr. Bryant[facility]

## 2020-08-06 NOTE — ED ADULT NURSE NOTE - CHPI ED NUR SYMPTOMS NEG
no loss of consciousness/no vomiting/no weakness/no nausea/no numbness/no blurred vision/no change in level of consciousness/no dizziness/no fever

## 2020-08-06 NOTE — H&P ADULT - HISTORY OF PRESENT ILLNESS
70 yo F PMHx hx of Remote Myocardial Infarction s/p PCI x 4, Anxiety (fearful of men), Multiple kidney stones, now presenting sent from PMD's office for aphasia. Son Cristian at beside. History given mostly by patient. Per son, his mother has had word finding difficulty, unable to finish sentences, and difficulty with executive tasks like word spelling for the past 2 years. He noted last week his nephew asked her how to spell "happy" and she could not tell him. Per son, she was working at [employer] this year without issues until COVID-19 and was furloughed. She lives alone, drives and performs all ADL's independently.     In ED   VS   labs  EKG: Sinus Dwayne 59  Imaging  < from: CT Angio Head w/ IV Cont (08.06.20 @ 20:35) >      1.   Right carotid system:  No hemodynamically significant stenosis.      2.   Left carotid system: No hemodynamically significant stenosis.      3.   Intracranial circulation:  No hemodynamically significant stenosis.      4.   Brain:  No acute infarct or hemorrhage.    Received 68 yo F PMHx hx of Remote Myocardial Infarction s/p PCI x 4, Anxiety (fearful of men), Multiple kidney stones, now presenting sent from PMD's office for aphasia. Son Cristian at beside. History given mostly by patient. Per son, his mother has had word finding difficulty, unable to finish sentences, and difficulty with executive tasks like word spelling for the past 2 years. He noted last week his nephew asked her how to spell "happy" and she could not tell him. Per son, she was working at [employer] this year without issues until COVID-19 and was furloughed. She lives alone, drives and performs all ADL's independently. Denies dizziness, falls, chest pain, shortness of breath, fevers/chills, or feeling ill.     In ED   VS 98.8 HR 56-60 /75 RR  16 SPO2 99 on RA  labs  Platelets 125   EKG: Sinus Dwayne 59  Imaging  < from: CT Angio Head w/ IV Cont (08.06.20 @ 20:35) >      1.   Right carotid system:  No hemodynamically significant stenosis.      2.   Left carotid system: No hemodynamically significant stenosis.      3.   Intracranial circulation:  No hemodynamically significant stenosis.      4.   Brain:  No acute infarct or hemorrhage.    Received

## 2020-08-06 NOTE — ED ADULT NURSE NOTE - NSIMPLEMENTINTERV_GEN_ALL_ED
Implemented All Fall Risk Interventions:  [place] to call system. Call bell, personal items and telephone within reach. Instruct patient to call for assistance. Room bathroom lighting operational. Non-slip footwear when patient is off stretcher. Physically safe environment: no spills, clutter or unnecessary equipment. Stretcher in lowest position, wheels locked, appropriate side rails in place. Provide visual cue, wrist band, yellow gown, etc. Monitor gait and stability. Monitor for mental status changes and reorient to person, place, and time. Review medications for side effects contributing to fall risk. Reinforce activity limits and safety measures with patient and family.

## 2020-08-07 ENCOUNTER — TRANSCRIPTION ENCOUNTER (OUTPATIENT)
Age: 70
End: 2020-08-07

## 2020-08-07 VITALS
TEMPERATURE: 98 F | SYSTOLIC BLOOD PRESSURE: 166 MMHG | DIASTOLIC BLOOD PRESSURE: 80 MMHG | HEART RATE: 70 BPM | RESPIRATION RATE: 18 BRPM | OXYGEN SATURATION: 99 %

## 2020-08-07 DIAGNOSIS — R00.1 BRADYCARDIA, UNSPECIFIED: ICD-10-CM

## 2020-08-07 DIAGNOSIS — I25.2 OLD MYOCARDIAL INFARCTION: ICD-10-CM

## 2020-08-07 DIAGNOSIS — Z29.9 ENCOUNTER FOR PROPHYLACTIC MEASURES, UNSPECIFIED: ICD-10-CM

## 2020-08-07 DIAGNOSIS — N20.0 CALCULUS OF KIDNEY: ICD-10-CM

## 2020-08-07 DIAGNOSIS — R47.01 APHASIA: ICD-10-CM

## 2020-08-07 LAB
A1C WITH ESTIMATED AVERAGE GLUCOSE RESULT: 5.3 % — SIGNIFICANT CHANGE UP (ref 4–5.6)
ANION GAP SERPL CALC-SCNC: 4 MMOL/L — LOW (ref 5–17)
BUN SERPL-MCNC: 15 MG/DL — SIGNIFICANT CHANGE UP (ref 7–23)
CALCIUM SERPL-MCNC: 8.8 MG/DL — SIGNIFICANT CHANGE UP (ref 8.5–10.1)
CHLORIDE SERPL-SCNC: 110 MMOL/L — HIGH (ref 96–108)
CHOLEST SERPL-MCNC: 117 MG/DL — SIGNIFICANT CHANGE UP (ref 10–199)
CO2 SERPL-SCNC: 29 MMOL/L — SIGNIFICANT CHANGE UP (ref 22–31)
CREAT SERPL-MCNC: 0.51 MG/DL — SIGNIFICANT CHANGE UP (ref 0.5–1.3)
ESTIMATED AVERAGE GLUCOSE: 105 MG/DL — SIGNIFICANT CHANGE UP (ref 68–114)
GLUCOSE SERPL-MCNC: 89 MG/DL — SIGNIFICANT CHANGE UP (ref 70–99)
HCT VFR BLD CALC: 35.7 % — SIGNIFICANT CHANGE UP (ref 34.5–45)
HDLC SERPL-MCNC: 31 MG/DL — LOW
HGB BLD-MCNC: 12.2 G/DL — SIGNIFICANT CHANGE UP (ref 11.5–15.5)
LIPID PNL WITH DIRECT LDL SERPL: 73 MG/DL — SIGNIFICANT CHANGE UP
MCHC RBC-ENTMCNC: 31.6 PG — SIGNIFICANT CHANGE UP (ref 27–34)
MCHC RBC-ENTMCNC: 34.2 GM/DL — SIGNIFICANT CHANGE UP (ref 32–36)
MCV RBC AUTO: 92.5 FL — SIGNIFICANT CHANGE UP (ref 80–100)
NRBC # BLD: 0 /100 WBCS — SIGNIFICANT CHANGE UP (ref 0–0)
PLATELET # BLD AUTO: 123 K/UL — LOW (ref 150–400)
POTASSIUM SERPL-MCNC: 3.7 MMOL/L — SIGNIFICANT CHANGE UP (ref 3.5–5.3)
POTASSIUM SERPL-SCNC: 3.7 MMOL/L — SIGNIFICANT CHANGE UP (ref 3.5–5.3)
RBC # BLD: 3.86 M/UL — SIGNIFICANT CHANGE UP (ref 3.8–5.2)
RBC # FLD: 12.6 % — SIGNIFICANT CHANGE UP (ref 10.3–14.5)
SARS-COV-2 RNA SPEC QL NAA+PROBE: SIGNIFICANT CHANGE UP
SODIUM SERPL-SCNC: 143 MMOL/L — SIGNIFICANT CHANGE UP (ref 135–145)
T3FREE SERPL-MCNC: 2.98 PG/ML — SIGNIFICANT CHANGE UP (ref 1.8–4.6)
T4 FREE SERPL-MCNC: 1.2 NG/DL — SIGNIFICANT CHANGE UP (ref 0.9–1.8)
TOTAL CHOLESTEROL/HDL RATIO MEASUREMENT: 3.8 RATIO — SIGNIFICANT CHANGE UP (ref 3.3–7.1)
TRIGL SERPL-MCNC: 68 MG/DL — SIGNIFICANT CHANGE UP (ref 10–149)
TSH SERPL-MCNC: 1.15 UIU/ML — SIGNIFICANT CHANGE UP (ref 0.36–3.74)
VIT B12 SERPL-MCNC: 1734 PG/ML — HIGH (ref 232–1245)
WBC # BLD: 4.51 K/UL — SIGNIFICANT CHANGE UP (ref 3.8–10.5)
WBC # FLD AUTO: 4.51 K/UL — SIGNIFICANT CHANGE UP (ref 3.8–10.5)

## 2020-08-07 PROCEDURE — 70498 CT ANGIOGRAPHY NECK: CPT

## 2020-08-07 PROCEDURE — 81001 URINALYSIS AUTO W/SCOPE: CPT

## 2020-08-07 PROCEDURE — 99222 1ST HOSP IP/OBS MODERATE 55: CPT

## 2020-08-07 PROCEDURE — 82607 VITAMIN B-12: CPT

## 2020-08-07 PROCEDURE — 85610 PROTHROMBIN TIME: CPT

## 2020-08-07 PROCEDURE — 84481 FREE ASSAY (FT-3): CPT

## 2020-08-07 PROCEDURE — 70450 CT HEAD/BRAIN W/O DYE: CPT

## 2020-08-07 PROCEDURE — 80061 LIPID PANEL: CPT

## 2020-08-07 PROCEDURE — 99239 HOSP IP/OBS DSCHRG MGMT >30: CPT | Mod: GC

## 2020-08-07 PROCEDURE — 99285 EMERGENCY DEPT VISIT HI MDM: CPT | Mod: 25

## 2020-08-07 PROCEDURE — 80053 COMPREHEN METABOLIC PANEL: CPT

## 2020-08-07 PROCEDURE — 84425 ASSAY OF VITAMIN B-1: CPT

## 2020-08-07 PROCEDURE — 80048 BASIC METABOLIC PNL TOTAL CA: CPT

## 2020-08-07 PROCEDURE — 93005 ELECTROCARDIOGRAM TRACING: CPT

## 2020-08-07 PROCEDURE — 84443 ASSAY THYROID STIM HORMONE: CPT

## 2020-08-07 PROCEDURE — 87635 SARS-COV-2 COVID-19 AMP PRB: CPT

## 2020-08-07 PROCEDURE — 85027 COMPLETE CBC AUTOMATED: CPT

## 2020-08-07 PROCEDURE — 83036 HEMOGLOBIN GLYCOSYLATED A1C: CPT

## 2020-08-07 PROCEDURE — 70551 MRI BRAIN STEM W/O DYE: CPT | Mod: 26

## 2020-08-07 PROCEDURE — 84484 ASSAY OF TROPONIN QUANT: CPT

## 2020-08-07 PROCEDURE — 92610 EVALUATE SWALLOWING FUNCTION: CPT

## 2020-08-07 PROCEDURE — 36415 COLL VENOUS BLD VENIPUNCTURE: CPT

## 2020-08-07 PROCEDURE — 86769 SARS-COV-2 COVID-19 ANTIBODY: CPT

## 2020-08-07 PROCEDURE — 84439 ASSAY OF FREE THYROXINE: CPT

## 2020-08-07 PROCEDURE — 85730 THROMBOPLASTIN TIME PARTIAL: CPT

## 2020-08-07 PROCEDURE — 70496 CT ANGIOGRAPHY HEAD: CPT

## 2020-08-07 PROCEDURE — 70551 MRI BRAIN STEM W/O DYE: CPT

## 2020-08-07 RX ORDER — RAMIPRIL 5 MG
1 CAPSULE ORAL
Qty: 0 | Refills: 0 | DISCHARGE

## 2020-08-07 RX ORDER — LISINOPRIL 2.5 MG/1
20 TABLET ORAL DAILY
Refills: 0 | Status: DISCONTINUED | OUTPATIENT
Start: 2020-08-07 | End: 2020-08-07

## 2020-08-07 RX ORDER — ENOXAPARIN SODIUM 100 MG/ML
40 INJECTION SUBCUTANEOUS DAILY
Refills: 0 | Status: DISCONTINUED | OUTPATIENT
Start: 2020-08-07 | End: 2020-08-07

## 2020-08-07 RX ORDER — ROSUVASTATIN CALCIUM 5 MG/1
1 TABLET ORAL
Qty: 0 | Refills: 0 | DISCHARGE

## 2020-08-07 RX ORDER — ASPIRIN/CALCIUM CARB/MAGNESIUM 324 MG
1 TABLET ORAL
Qty: 0 | Refills: 0 | DISCHARGE

## 2020-08-07 RX ORDER — ASPIRIN/CALCIUM CARB/MAGNESIUM 324 MG
325 TABLET ORAL DAILY
Refills: 0 | Status: DISCONTINUED | OUTPATIENT
Start: 2020-08-07 | End: 2020-08-07

## 2020-08-07 RX ORDER — TAMSULOSIN HYDROCHLORIDE 0.4 MG/1
0.4 CAPSULE ORAL AT BEDTIME
Refills: 0 | Status: DISCONTINUED | OUTPATIENT
Start: 2020-08-07 | End: 2020-08-07

## 2020-08-07 RX ORDER — ATORVASTATIN CALCIUM 80 MG/1
80 TABLET, FILM COATED ORAL AT BEDTIME
Refills: 0 | Status: DISCONTINUED | OUTPATIENT
Start: 2020-08-07 | End: 2020-08-07

## 2020-08-07 RX ORDER — CEFUROXIME AXETIL 250 MG
250 TABLET ORAL EVERY 12 HOURS
Refills: 0 | Status: DISCONTINUED | OUTPATIENT
Start: 2020-08-07 | End: 2020-08-07

## 2020-08-07 RX ORDER — METOPROLOL TARTRATE 50 MG
1 TABLET ORAL
Qty: 0 | Refills: 0 | DISCHARGE

## 2020-08-07 RX ADMIN — Medication 250 MILLIGRAM(S): at 02:38

## 2020-08-07 RX ADMIN — LISINOPRIL 20 MILLIGRAM(S): 2.5 TABLET ORAL at 05:45

## 2020-08-07 NOTE — DISCHARGE NOTE NURSING/CASE MANAGEMENT/SOCIAL WORK - NSDCPEPTSTRK_GEN_ALL_CORE
Need for follow up after discharge/Prescribed medications/Stroke education booklet/Signs and symptoms of stroke/Risk factors for stroke/Stroke support groups for patients, families, and friends/Stroke warning signs and symptoms/Call 911 for stroke

## 2020-08-07 NOTE — PROGRESS NOTE ADULT - PROBLEM SELECTOR PLAN 5
DVT - Lovenox 40 daily DVT - Lovenox 40 daily    IMPROVE VTE Individual Risk Assessment          RISK                                                          Points  [  ] Previous VTE                                                3  [  ] Thrombophilia                                             2  [  ] Lower limb paralysis                                   2        (unable to hold up >15 seconds)    [  ] Current Cancer                                             2         (within 6 months)  [  ] Immobilization > 24 hrs                              1  [  ] ICU/CCU stay > 24 hours                             1  [ x ] Age > 60                                                         1    IMPROVE VTE Score:         [    1     ]

## 2020-08-07 NOTE — CONSULT NOTE ADULT - ASSESSMENT
68 yo F PMHx hx of Remote Myocardial Infarction s/p PCI x 4, Anxiety (fearful of men), Multiple kidney stones, now presenting sent from PMD's office for aphasia, with sinus bradycardia on EKG likely secondary to metoprolol    #Sinus bradycardia  - Likely secondary to metoprolol  - Hold metoprolol in setting of bradycardia   - Patient is not complaining of any anginal symptoms at this time.  - No clear evidence of acute ischemia, trops negative x 1  - EKG NSR at 63 HR in June 2020  - Monitor on tele  - F/u echo   - No meaningful evidence of volume overload.    # Aphasia, secondary to dementia vs possible aphasia  - Continue Aspirin 325 mg  - continue atorvastatin 80  - f/u neuro recs    #hx of MI  - No anginal symptoms at this time  - Continue aspirin 325 mg  - Continue Lisinopril 20 mg   - Continue Atorvastatin 80 mg      - Monitor and replete lytes, keep K>4, Mg>2.  - Strict I/Os, daily weights.  - Other cardiovascular workup will depend on clinical course.  - All other workup per primary team.  - Will continue to follow. 68 yo F PMHx hx of Remote Myocardial Infarction s/p PCI x 4, Anxiety (fearful of men), Multiple kidney stones, now presenting sent from PMD's office for aphasia, with sinus bradycardia on EKG likely secondary to metoprolol    #Sinus bradycardia, EKG Sinus rafia HR 59  - Likely secondary to metoprolol  - If no need for permissive HTN then recommend lowering metoprolol to 50 mg qd  - Patient is not complaining of any anginal symptoms at this time.  - No clear evidence of acute ischemia, trops negative x 1  - EKG NSR at 63 HR in June 2020  - Monitor on tele  - F/u echo   - No meaningful evidence of volume overload.    # Aphasia, secondary to dementia vs possible aphasia  - Continue Aspirin 325 mg  - continue atorvastatin 80  - f/u neuro recs    #hx of MI  - No anginal symptoms at this time  - Continue aspirin 325 mg  - Continue Lisinopril 20 mg   - Continue Atorvastatin 80 mg      - Monitor and replete lytes, keep K>4, Mg>2.  - Strict I/Os, daily weights.  - Other cardiovascular workup will depend on clinical course.  - All other workup per primary team.  - Will continue to follow. 68 yo F PMHx hx of Remote Myocardial Infarction s/p PCI x 4, Anxiety (fearful of men), Multiple kidney stones, now presenting sent from PMD's office for aphasia, with sinus bradycardia on EKG likely secondary to metoprolol    #Sinus bradycardia, EKG Sinus rafia HR 59  - Likely secondary to metoprolol  - If no need for permissive HTN then recommend lowering metoprolol to 50 mg qd with hold parameters  - Patient is not complaining of any anginal symptoms at this time.  - No clear evidence of acute ischemia, trops negative x 1  - EKG NSR at 63 HR in June 2020  - Monitor on tele  - F/u echo   - No meaningful evidence of volume overload.    # Aphasia, secondary to dementia vs possible aphasia  - Continue Aspirin 325 mg  - continue atorvastatin 80  - f/u neuro recs    #hx of MI  - No anginal symptoms at this time  - Continue aspirin 325 mg  - Continue Lisinopril 20 mg   - Continue Atorvastatin 80 mg      - Monitor and replete lytes, keep K>4, Mg>2.  - Strict I/Os, daily weights.  - Other cardiovascular workup will depend on clinical course.  - All other workup per primary team.  - Will continue to follow. 70 yo F PMHx hx of Remote Myocardial Infarction s/p PCI x 4, Anxiety, Multiple kidney stones, now presenting sent from PMD's office for aphasia, with sinus bradycardia on EKG.    #Sinus bradycardia, EKG Sinus rafia HR 59  - Likely secondary to metoprolol  - can decrease toprol xl to 50, with hold parameters  - Patient is not complaining of any anginal symptoms at this time.  - No clear evidence of acute ischemia, trops negative x 1  - EKG NSR at 63 HR in June 2020  - Monitor on tele  - check echocardiogram  - No meaningful evidence of volume overload.    # Aphasia, secondary to dementia vs possible aphasia  - Continue Aspirin 325 mg  - continue atorvastatin 80  - f/u neuro recs    #hx of MI  - No anginal symptoms at this time  - Continue aspirin 325 mg  - Continue Lisinopril 20 mg   - Continue Atorvastatin 80 mg    - Monitor and replete lytes, keep K>4, Mg>2.  - Strict I/Os, daily weights.  - Other cardiovascular workup will depend on clinical course.  - All other workup per primary team.  - Will continue to follow.

## 2020-08-07 NOTE — CONSULT NOTE ADULT - ATTENDING COMMENTS
Seen/examined. agree with above.  asymptomatic sinus bradycardia  can cont metoprolol at lower dose with hold parameters  neuro evaluation pending

## 2020-08-07 NOTE — PROGRESS NOTE ADULT - SUBJECTIVE AND OBJECTIVE BOX
Patient is a 69y old  Female who presents with a chief complaint of r/o TIA (07 Aug 2020 10:53)      INTERVAL HPI/OVERNIGHT EVENTS: Patient seen and examined at bedside. No overnight events occurred. Patient has no complaints at this time. Denies fevers, chills, headache, lightheadedness, chest pain, dyspnea, abdominal pain, n/v/d/c.    MEDICATIONS  (STANDING):  aspirin enteric coated 325 milliGRAM(s) Oral daily  atorvastatin 80 milliGRAM(s) Oral at bedtime  cefuroxime   Tablet 250 milliGRAM(s) Oral every 12 hours  enoxaparin Injectable 40 milliGRAM(s) SubCutaneous daily  lisinopril 20 milliGRAM(s) Oral daily  tamsulosin 0.4 milliGRAM(s) Oral at bedtime    MEDICATIONS  (PRN):      Allergies    No Known Allergies    Intolerances        REVIEW OF SYSTEMS:  CONSTITUTIONAL: No fever or chills  HEENT:  No headache, no sore throat  RESPIRATORY: No cough, wheezing, or shortness of breath  CARDIOVASCULAR: No chest pain, palpitations  GASTROINTESTINAL: No abd pain, nausea, vomiting, or diarrhea  GENITOURINARY: No dysuria, frequency, or hematuria  NEUROLOGICAL: no focal weakness or dizziness  MUSCULOSKELETAL: no myalgias     Vital Signs Last 24 Hrs  T(C): 36.7 (07 Aug 2020 07:36), Max: 37 (06 Aug 2020 20:49)  T(F): 98 (07 Aug 2020 07:36), Max: 98.6 (06 Aug 2020 20:49)  HR: 70 (07 Aug 2020 07:36) (56 - 73)  BP: 166/80 (07 Aug 2020 07:36) (149/81 - 177/83)  BP(mean): --  RR: 18 (07 Aug 2020 07:36) (16 - 18)  SpO2: 99% (07 Aug 2020 07:36) (96% - 100%)    PHYSICAL EXAM:  GENERAL: NAD  HEENT:  anicteric, moist mucous membranes  CHEST/LUNG:  CTA b/l, no rales, wheezes, or rhonchi  HEART:  RRR, S1, S2  ABDOMEN:  BS+, soft, nontender, nondistended  EXTREMITIES: no edema, cyanosis, or calf tenderness  NERVOUS SYSTEM: answers questions and follows commands appropriately    LABS:                        12.2   4.51  )-----------( 123      ( 07 Aug 2020 07:03 )             35.7     CBC Full  -  ( 07 Aug 2020 07:03 )  WBC Count : 4.51 K/uL  Hemoglobin : 12.2 g/dL  Hematocrit : 35.7 %  Platelet Count - Automated : 123 K/uL  Mean Cell Volume : 92.5 fl  Mean Cell Hemoglobin : 31.6 pg  Mean Cell Hemoglobin Concentration : 34.2 gm/dL  Auto Neutrophil # : x  Auto Lymphocyte # : x  Auto Monocyte # : x  Auto Eosinophil # : x  Auto Basophil # : x  Auto Neutrophil % : x  Auto Lymphocyte % : x  Auto Monocyte % : x  Auto Eosinophil % : x  Auto Basophil % : x    07 Aug 2020 07:03    143    |  110    |  15     ----------------------------<  89     3.7     |  29     |  0.51     Ca    8.8        07 Aug 2020 07:03    TPro  7.1    /  Alb  3.7    /  TBili  0.6    /  DBili  x      /  AST  20     /  ALT  19     /  AlkPhos  70     06 Aug 2020 19:19    PT/INR - ( 06 Aug 2020 19:19 )   PT: 12.3 sec;   INR: 1.05 ratio         PTT - ( 06 Aug 2020 19:19 )  PTT:32.3 sec  Urinalysis Basic - ( 06 Aug 2020 21:05 )    Color: Yellow / Appearance: Clear / S.010 / pH: x  Gluc: x / Ketone: Negative  / Bili: Negative / Urobili: Negative   Blood: x / Protein: Negative / Nitrite: Negative   Leuk Esterase: Small / RBC: 0-2 /HPF / WBC 6-10   Sq Epi: x / Non Sq Epi: Occasional / Bacteria: Occasional      CAPILLARY BLOOD GLUCOSE            Culture - Urine (collected 20 @ 16:17)  Source: .Urine Clean Catch (Midstream)  Final Report (20 @ 13:03):    <10,000 CFU/mL Normal Urogenital Inge        RADIOLOGY & ADDITIONAL TESTS:    Personally reviewed.     Consultant(s) Notes Reviewed:  [x] YES  [ ] NO Patient is a 69y old  Female who presents with a chief complaint of r/o TIA (07 Aug 2020 10:53)      INTERVAL HPI/OVERNIGHT EVENTS: Patient seen and examined at bedside. No overnight events occurred. Patient has no complaints at this time. Denies fevers, chills, headache, lightheadedness, chest pain, dyspnea, abdominal pain, n/v/d/c.    MEDICATIONS  (STANDING):  aspirin enteric coated 325 milliGRAM(s) Oral daily  atorvastatin 80 milliGRAM(s) Oral at bedtime  cefuroxime   Tablet 250 milliGRAM(s) Oral every 12 hours  enoxaparin Injectable 40 milliGRAM(s) SubCutaneous daily  lisinopril 20 milliGRAM(s) Oral daily  tamsulosin 0.4 milliGRAM(s) Oral at bedtime    MEDICATIONS  (PRN):      Allergies    No Known Allergies    Intolerances        REVIEW OF SYSTEMS:  CONSTITUTIONAL: No fever or chills  HEENT:  No headache, no sore throat  RESPIRATORY: No cough, wheezing, or shortness of breath  CARDIOVASCULAR: No chest pain, palpitations  GASTROINTESTINAL: No abd pain, nausea, vomiting, or diarrhea  GENITOURINARY: No dysuria, frequency, or hematuria  NEUROLOGICAL: no focal weakness or dizziness  MUSCULOSKELETAL: no myalgias     Vital Signs Last 24 Hrs  T(C): 36.7 (07 Aug 2020 07:36), Max: 37 (06 Aug 2020 20:49)  T(F): 98 (07 Aug 2020 07:36), Max: 98.6 (06 Aug 2020 20:49)  HR: 70 (07 Aug 2020 07:36) (56 - 73)  BP: 166/80 (07 Aug 2020 07:36) (149/81 - 177/83)  BP(mean): --  RR: 18 (07 Aug 2020 07:36) (16 - 18)  SpO2: 99% (07 Aug 2020 07:36) (96% - 100%)    PHYSICAL EXAM:  GENERAL: NAD  HEENT:  anicteric, moist mucous membranes  CHEST/LUNG:  CTA b/l, no rales, wheezes, or rhonchi  HEART:  RRR, S1, S2  ABDOMEN:  BS+, soft, nontender, nondistended  EXTREMITIES: no edema, cyanosis, or calf tenderness  NERVOUS SYSTEM: answers questions and follows commands appropriately, CN II-XII intact, strength 5/5 x4, sensation intact b/l x4, A&O x3    LABS:                        12.2   4.51  )-----------( 123      ( 07 Aug 2020 07:03 )             35.7     CBC Full  -  ( 07 Aug 2020 07:03 )  WBC Count : 4.51 K/uL  Hemoglobin : 12.2 g/dL  Hematocrit : 35.7 %  Platelet Count - Automated : 123 K/uL  Mean Cell Volume : 92.5 fl  Mean Cell Hemoglobin : 31.6 pg  Mean Cell Hemoglobin Concentration : 34.2 gm/dL  Auto Neutrophil # : x  Auto Lymphocyte # : x  Auto Monocyte # : x  Auto Eosinophil # : x  Auto Basophil # : x  Auto Neutrophil % : x  Auto Lymphocyte % : x  Auto Monocyte % : x  Auto Eosinophil % : x  Auto Basophil % : x    07 Aug 2020 07:03    143    |  110    |  15     ----------------------------<  89     3.7     |  29     |  0.51     Ca    8.8        07 Aug 2020 07:03    TPro  7.1    /  Alb  3.7    /  TBili  0.6    /  DBili  x      /  AST  20     /  ALT  19     /  AlkPhos  70     06 Aug 2020 19:19    PT/INR - ( 06 Aug 2020 19:19 )   PT: 12.3 sec;   INR: 1.05 ratio         PTT - ( 06 Aug 2020 19:19 )  PTT:32.3 sec  Urinalysis Basic - ( 06 Aug 2020 21:05 )    Color: Yellow / Appearance: Clear / S.010 / pH: x  Gluc: x / Ketone: Negative  / Bili: Negative / Urobili: Negative   Blood: x / Protein: Negative / Nitrite: Negative   Leuk Esterase: Small / RBC: 0-2 /HPF / WBC 6-10   Sq Epi: x / Non Sq Epi: Occasional / Bacteria: Occasional      CAPILLARY BLOOD GLUCOSE            Culture - Urine (collected 20 @ 16:17)  Source: .Urine Clean Catch (Midstream)  Final Report (20 @ 13:03):    <10,000 CFU/mL Normal Urogenital Inge        RADIOLOGY & ADDITIONAL TESTS:    Personally reviewed.     Consultant(s) Notes Reviewed:  [x] YES  [ ] NO Patient is a 69y old  Female who presents with a chief complaint of r/o TIA (07 Aug 2020 10:53)      INTERVAL HPI/OVERNIGHT EVENTS: Patient seen and examined at bedside. Patient is AAO X 3 in no acute distress. Seesms forgetful but is able to speak in concise sentences. No overnight events occurred. Patient has no complaints at this time. Denies fevers, chills, headache, lightheadedness, chest pain, dyspnea, abdominal pain, n/v/d/c.    MEDICATIONS  (STANDING):  aspirin enteric coated 325 milliGRAM(s) Oral daily  atorvastatin 80 milliGRAM(s) Oral at bedtime  cefuroxime   Tablet 250 milliGRAM(s) Oral every 12 hours  enoxaparin Injectable 40 milliGRAM(s) SubCutaneous daily  lisinopril 20 milliGRAM(s) Oral daily  tamsulosin 0.4 milliGRAM(s) Oral at bedtime    MEDICATIONS  (PRN):      Allergies    No Known Allergies    Intolerances        REVIEW OF SYSTEMS:  CONSTITUTIONAL: No fever or chills  HEENT:  No headache, no sore throat  RESPIRATORY: No cough, wheezing, or shortness of breath  CARDIOVASCULAR: No chest pain, palpitations  GASTROINTESTINAL: No abd pain, nausea, vomiting, or diarrhea  GENITOURINARY: No dysuria, frequency, or hematuria  NEUROLOGICAL: no focal weakness or dizziness  MUSCULOSKELETAL: no myalgias     Vital Signs Last 24 Hrs  T(C): 36.7 (07 Aug 2020 07:36), Max: 37 (06 Aug 2020 20:49)  T(F): 98 (07 Aug 2020 07:36), Max: 98.6 (06 Aug 2020 20:49)  HR: 70 (07 Aug 2020 07:36) (56 - 73)  BP: 166/80 (07 Aug 2020 07:36) (149/81 - 177/83)  BP(mean): --  RR: 18 (07 Aug 2020 07:36) (16 - 18)  SpO2: 99% (07 Aug 2020 07:36) (96% - 100%)    PHYSICAL EXAM:  GENERAL: NAD  HEENT:  anicteric, moist mucous membranes  CHEST/LUNG:  CTA b/l, no rales, wheezes, or rhonchi  HEART:  RRR, S1, S2  ABDOMEN:  BS+, soft, nontender, nondistended  EXTREMITIES: no edema, cyanosis, or calf tenderness  NERVOUS SYSTEM: answers questions and follows commands appropriately, no focal neurologic deficits, good muscle tone, sensation intact b/l x4, A&O x3    LABS:                        12.2   4.51  )-----------( 123      ( 07 Aug 2020 07:03 )             35.7     CBC Full  -  ( 07 Aug 2020 07:03 )  WBC Count : 4.51 K/uL  Hemoglobin : 12.2 g/dL  Hematocrit : 35.7 %  Platelet Count - Automated : 123 K/uL  Mean Cell Volume : 92.5 fl  Mean Cell Hemoglobin : 31.6 pg  Mean Cell Hemoglobin Concentration : 34.2 gm/dL  Auto Neutrophil # : x  Auto Lymphocyte # : x  Auto Monocyte # : x  Auto Eosinophil # : x  Auto Basophil # : x  Auto Neutrophil % : x  Auto Lymphocyte % : x  Auto Monocyte % : x  Auto Eosinophil % : x  Auto Basophil % : x    07 Aug 2020 07:03    143    |  110    |  15     ----------------------------<  89     3.7     |  29     |  0.51     Ca    8.8        07 Aug 2020 07:03    TPro  7.1    /  Alb  3.7    /  TBili  0.6    /  DBili  x      /  AST  20     /  ALT  19     /  AlkPhos  70     06 Aug 2020 19:19    PT/INR - ( 06 Aug 2020 19:19 )   PT: 12.3 sec;   INR: 1.05 ratio         PTT - ( 06 Aug 2020 19:19 )  PTT:32.3 sec  Urinalysis Basic - ( 06 Aug 2020 21:05 )    Color: Yellow / Appearance: Clear / S.010 / pH: x  Gluc: x / Ketone: Negative  / Bili: Negative / Urobili: Negative   Blood: x / Protein: Negative / Nitrite: Negative   Leuk Esterase: Small / RBC: 0-2 /HPF / WBC 6-10   Sq Epi: x / Non Sq Epi: Occasional / Bacteria: Occasional      CAPILLARY BLOOD GLUCOSE            Culture - Urine (collected 20 @ 16:17)  Source: .Urine Clean Catch (Midstream)  Final Report (20 @ 13:03):    <10,000 CFU/mL Normal Urogenital Inge        RADIOLOGY & ADDITIONAL TESTS:    Personally reviewed.     Consultant(s) Notes Reviewed:  [x] YES  [ ] NO

## 2020-08-07 NOTE — SWALLOW BEDSIDE ASSESSMENT ADULT - COMMENTS
Consult received and chart reviewed. The patient 70 yo F PMHx hx of Remote Myocardial Infarction s/p PCI x 4, Anxiety (fearful of men), Multiple kidney stones, now presenting sent from PMD's office for aphasia. Son Cristian at beside. History given mostly by patient. Per son, his mother has had word finding difficulty, unable to finish sentences, and difficulty with executive tasks like word spelling for the past 2 years. He noted last week his nephew asked her how to spell "happy" and she could not tell him. Per son, she was working at [employer] this year without issues until COVID-19 and was furloughed. Consult received and chart reviewed. The patient was seen at bedside this AM for an initial assessment of swallow function, at which time she was awake and cooperative. Patient exhibited word-finding difficulties and phonemic and semantic paraphasias throughout conversational discourse with this clinician. She stated, "I can see the words in my head but I can't get them out."    Per charting, the patient is a "68 yo F PMHx hx of Remote Myocardial Infarction s/p PCI x 4, Anxiety (fearful of men), Multiple kidney stones, now presenting sent from PMD's office for aphasia. Son Cristian at beside. History given mostly by patient. Per son, his mother has had word finding difficulty, unable to finish sentences, and difficulty with executive tasks like word spelling for the past 2 years. He noted last week his nephew asked her how to spell "happy" and she could not tell him. Per son, she was working at [employer] this year without issues until COVID-19 and was furloughed."    CT of the head revealed, "No acute infarct or hemorrhage." Thre are no CXR available for review at this time.    Discussed results and recommendations from this evaluation with the patient, RN, and call out to MD.

## 2020-08-07 NOTE — ED ADULT NURSE REASSESSMENT NOTE - NS ED NURSE REASSESS COMMENT FT1
Received care at this time.  VSS.  A/Ox4.  Noted some word finding difficulties.  ie slow to retrieve date yet correct in the outset.  CORNEL 5/5.  No c/o anykind at this time

## 2020-08-07 NOTE — SWALLOW BEDSIDE ASSESSMENT ADULT - SWALLOW EVAL: RECOMMENDED FEEDING/EATING TECHNIQUES
allow for swallow between intakes/maintain upright posture during/after eating for 30 mins/oral hygiene/small sips/bites/crush medication (when feasible)/position upright (90 degrees)

## 2020-08-07 NOTE — PROGRESS NOTE ADULT - ATTENDING COMMENTS
I personally conducted a physical examination of the patient. I personally gathered the patient's history. I edited the above listed findings which were prepared by the listed resident physician. I personally discussed the plan of care with the patient. The questions and concerns were addressed to the best of my ability. The patient is in agreement with the listed treatment plan.    Patient seen and examined, denies any complaints at this time. States she was having a hard time expressing herself. Discussed with Neurology, likely patient has had CVA in past and family also stating patient has had expressive aphasia for past 2 years. MRI done showing chronic microhemorrhages- patient advised to follow up with Neurology outpatient. Patient with sinus bradycardia, however lowest documented HR was 56 bpm in flowsheet. Discussed with Cardio, Dr. Taveras, given patient with elevated BP and sinus bradycardia (but in high 50s) can resume home dose metoprolol on D/C. Patient AAOx3 on exam, with no focal neuro deficits noted. D/C planning.

## 2020-08-07 NOTE — DISCHARGE NOTE PROVIDER - PROVIDER TOKENS
PROVIDER:[TOKEN:[[id number]]] PROVIDER:[TOKEN:[[id number]]],PROVIDER:[TOKEN:[[id number]],FOLLOWUP:[2 weeks]]

## 2020-08-07 NOTE — CONSULT NOTE ADULT - SUBJECTIVE AND OBJECTIVE BOX
Patient is a 69y old  Female who presents with a chief complaint of r/o TIA (06 Aug 2020 20:30)      HPI:  70 yo F PMHx hx of Remote Myocardial Infarction s/p PCI x 4, Anxiety (fearful of men), Multiple kidney stones, now presenting sent from PMD's office for aphasia. Son Cristian at beside. History given mostly by patient. Per son, his mother has had word finding difficulty, unable to finish sentences, and difficulty with executive tasks like word spelling for the past 2 years. He noted last week his nephew asked her how to spell "happy" and she could not tell him. Per son, she was working at [employer] this year without issues until COVID-19 and was furloughed. She lives alone, drives and performs all ADL's independently. Denies dizziness, falls, chest pain, shortness of breath, fevers/chills, or feeling ill.     In ED   VS 98.8 HR 56-60 /75 RR  16 SPO2 99 on RA  labs  Platelets 125   EKG: Sinus Dwayne 59  Imaging  < from: CT Angio Head w/ IV Cont (20 @ 20:35) >      1.   Right carotid system:  No hemodynamically significant stenosis.      2.   Left carotid system: No hemodynamically significant stenosis.      3.   Intracranial circulation:  No hemodynamically significant stenosis.      4.   Brain:  No acute infarct or hemorrhage.    Received (06 Aug 2020 20:30)      PAST MEDICAL & SURGICAL HISTORY:  Kidney stone  MI (myocardial infarction)  No significant past surgical history        MEDICATIONS  (STANDING):  aspirin enteric coated 325 milliGRAM(s) Oral daily  atorvastatin 80 milliGRAM(s) Oral at bedtime  cefuroxime   Tablet 250 milliGRAM(s) Oral every 12 hours  enoxaparin Injectable 40 milliGRAM(s) SubCutaneous daily  lisinopril 20 milliGRAM(s) Oral daily  tamsulosin 0.4 milliGRAM(s) Oral at bedtime    MEDICATIONS  (PRN):      FAMILY HISTORY:    Denies Family history of CAD or early MI      Constitutional: denies fever, chills  HEENT: denies blurry vision,  Respiratory: denies SOB, HUIZAR, cough  Cardiovascular: denies CP, palpitations, orthopnea, PND, LE edema  Gastrointestinal: denies nausea, vomiting, abdominal pain  Neurologic: denies headache, weakness, dizziness  ROS negative except as noted above      SOCIAL HISTORY:    No tobacco, Alcohol or Ddrug use    Vital Signs Last 24 Hrs  T(C): 36.7 (07 Aug 2020 07:36), Max: 37 (06 Aug 2020 20:49)  T(F): 98 (07 Aug 2020 07:36), Max: 98.6 (06 Aug 2020 20:49)  HR: 70 (07 Aug 2020 07:36) (56 - 73)  BP: 166/80 (07 Aug 2020 07:36) (149/81 - 177/83)  BP(mean): --  RR: 18 (07 Aug 2020 07:36) (16 - 18)  SpO2: 99% (07 Aug 2020 07:36) (96% - 100%)    Physical Exam:  General: Well developed, well nourished, NAD  HEENT: NCAT  Neurology:   Respiratory: CTA B/L, No W/R/R  CV: RRR, +S1/S2, no murmurs, rubs or gallops  Abdominal: Soft, NT, ND +BSx4, no palpable masses  Extremities: No C/C/E, + peripheral pulses  Skin: warm, dry, normal color      ECG:    I&O's Detail      LABS:                        12.2   4.51  )-----------( 123      ( 07 Aug 2020 07:03 )             35.7     08-07    143  |  110<H>  |  15  ----------------------------<  89  3.7   |  29  |  0.51    Ca    8.8      07 Aug 2020 07:03    TPro  7.1  /  Alb  3.7  /  TBili  0.6  /  DBili  x   /  AST  20  /  ALT  19  /  AlkPhos  70  08-06    CARDIAC MARKERS ( 06 Aug 2020 19:19 )  <.015 ng/mL / x     / x     / x     / x          PT/INR - ( 06 Aug 2020 19:19 )   PT: 12.3 sec;   INR: 1.05 ratio         PTT - ( 06 Aug 2020 19:19 )  PTT:32.3 sec  Urinalysis Basic - ( 06 Aug 2020 21:05 )    Color: Yellow / Appearance: Clear / S.010 / pH: x  Gluc: x / Ketone: Negative  / Bili: Negative / Urobili: Negative   Blood: x / Protein: Negative / Nitrite: Negative   Leuk Esterase: Small / RBC: 0-2 /HPF / WBC 6-10   Sq Epi: x / Non Sq Epi: Occasional / Bacteria: Occasional      I&O's Summary    BNP  RADIOLOGY & ADDITIONAL STUDIES: Patient is a 69y old  Female who presents with a chief complaint of r/o TIA (06 Aug 2020 20:30)      HPI:  68 yo F PMHx hx of Remote Myocardial Infarction s/p PCI x 4, Anxiety (fearful of men), Multiple kidney stones, now presenting sent from PMD's office for aphasia. Pt alone at bedside. She says she has had difficulty remembering things for the past two years. She has difficulty finishing sentences, remembering certain names, and forgetful where she puts certain objects around the house. She lives alone, drives and performs all ADL's independently. Denies dizziness, falls, chest pain, shortness of breath, fevers/chills, feeling ill, palpitations, or lower extremity edema. She does not remember if she sees a cardiologist, and said she "got stents for her heart many years ago".     In ED   VS 98.8 HR 56-60 /75 RR  16 SPO2 99 on RA  labs  Platelets 125   EKG: Sinus Rafia 59  Imaging  < from: CT Angio Head w/ IV Cont (20 @ 20:35) >      1.   Right carotid system:  No hemodynamically significant stenosis.      2.   Left carotid system: No hemodynamically significant stenosis.      3.   Intracranial circulation:  No hemodynamically significant stenosis.      4.   Brain:  No acute infarct or hemorrhage.    Received (06 Aug 2020 20:30)      PAST MEDICAL & SURGICAL HISTORY:  Kidney stone  MI (myocardial infarction)  No significant past surgical history        MEDICATIONS  (STANDING):  aspirin enteric coated 325 milliGRAM(s) Oral daily  atorvastatin 80 milliGRAM(s) Oral at bedtime  cefuroxime   Tablet 250 milliGRAM(s) Oral every 12 hours  enoxaparin Injectable 40 milliGRAM(s) SubCutaneous daily  lisinopril 20 milliGRAM(s) Oral daily  tamsulosin 0.4 milliGRAM(s) Oral at bedtime    MEDICATIONS  (PRN):      FAMILY HISTORY:    Does not recall Family history of CAD or early MI      Constitutional: denies fever, chills  HEENT: denies blurry vision,  Respiratory: denies SOB, HUIZAR, cough  Cardiovascular: denies CP, palpitations, orthopnea, PND, LE edema  Gastrointestinal: denies nausea, vomiting, abdominal pain  Neurologic: Admits to memory loss. Denies headache, weakness, dizziness  ROS negative except as noted above      SOCIAL HISTORY:    No tobacco, Alcohol or Drug use    Vital Signs Last 24 Hrs  T(C): 36.7 (07 Aug 2020 07:36), Max: 37 (06 Aug 2020 20:49)  T(F): 98 (07 Aug 2020 07:36), Max: 98.6 (06 Aug 2020 20:49)  HR: 70 (07 Aug 2020 07:36) (56 - 73)  BP: 166/80 (07 Aug 2020 07:36) (149/81 - 177/83)  BP(mean): --  RR: 18 (07 Aug 2020 07:36) (16 - 18)  SpO2: 99% (07 Aug 2020 07:36) (96% - 100%)    Physical Exam:  General: Well developed, well nourished, NAD, resting in bed comfortably   HEENT: NCAT  Neurology: A&Ox 2. motor strength 5/5 b/l. CN 2-12 intact.   Respiratory: CTA B/L, No W/R/R  CV: RRR, +S1/S2, no murmurs, rubs or gallops  Abdominal: Soft, NT, ND +BSx4, no palpable masses  Extremities: No C/C/E, + peripheral pulses  Skin: warm, dry, normal color      ECG: Sinus rafia HR 59    I&O's Detail      LABS:                        12.2   4.51  )-----------( 123      ( 07 Aug 2020 07:03 )             35.7     08-07    143  |  110<H>  |  15  ----------------------------<  89  3.7   |  29  |  0.51    Ca    8.8      07 Aug 2020 07:03    TPro  7.1  /  Alb  3.7  /  TBili  0.6  /  DBili  x   /  AST  20  /  ALT  19  /  AlkPhos  70  08-06    CARDIAC MARKERS ( 06 Aug 2020 19:19 )  <.015 ng/mL / x     / x     / x     / x          PT/INR - ( 06 Aug 2020 19:19 )   PT: 12.3 sec;   INR: 1.05 ratio         PTT - ( 06 Aug 2020 19:19 )  PTT:32.3 sec  Urinalysis Basic - ( 06 Aug 2020 21:05 )    Color: Yellow / Appearance: Clear / S.010 / pH: x  Gluc: x / Ketone: Negative  / Bili: Negative / Urobili: Negative   Blood: x / Protein: Negative / Nitrite: Negative   Leuk Esterase: Small / RBC: 0-2 /HPF / WBC 6-10   Sq Epi: x / Non Sq Epi: Occasional / Bacteria: Occasional      I&O's Summary

## 2020-08-07 NOTE — DISCHARGE NOTE PROVIDER - CARE PROVIDER_API CALL
Мария Drake  NEUROLOGY  [address] [place] [address]  Phone: [phone]  Fax: [phone]  Follow Up Time: Мария Drake  NEUROLOGY  [address] [place] [address]  Phone: [phone]  Fax: [phone]  Follow Up Time:     Justin Galan  INTERNAL MEDICINE  [address] [place] [id number]  Phone: [phone]  Fax: [phone]  Follow Up Time: 2 weeks

## 2020-08-07 NOTE — PROGRESS NOTE ADULT - PROBLEM SELECTOR PLAN 1
- Rule out CVA for "Aphasia" vs worsening Dementia, unclear chronicity of symptoms, per family 2 years, per son PCP thought aphasia was an acute change in mental status  -  given as patient is on ASA 81 mg at home  - Patient on high dose statin, continue formulary atorvastatin 80  - CT head negative for hemorrhage or mass or midline shift, CT angio : Negative for meaningful stenosis  - Mini Cog exam total : 0 , at this score <3 , likely element of dementia   - Follow up A1c and lipid panel  - Follow up B12 level, TSH  - Monitor on telemetry for arrythmia  - TTE ordered for the AM  - Passed Dysphagia screen with nursing, on DASH diet  - ordered Speech and Swallow for AM , PT ordered for AM  - Consulted Neuro: Dr. Beavers - Rule out CVA for "Aphasia" vs worsening Dementia, unclear chronicity of symptoms, per family 2 years, per son PCP thought aphasia was an acute change in mental status  -  given as patient is on ASA 81 mg at home  - Patient on high dose statin, continue formulary atorvastatin 80  - CT head negative for hemorrhage or mass or midline shift, CT angio : Negative for meaningful stenosis  - Mini Cog exam total : 0 , at this score <3 , likely element of dementia   - Follow up A1c  -lipid panel total cholesterol 117, LDL 73, HDL 31, Triglyceride 68  - B12 level 1734  -TSH 1.15  - Monitor on telemetry for arrythmia  - TTE ordered for the AM  - Passed Dysphagia screen with nursing, on DASH diet  - Speech and Swallow result is Regular solids with thin liquids, as tolerated  -PT ordered for AM  - Consulted Neuro: Dr. Beavers - Rule out CVA for "Aphasia" vs worsening Dementia, unclear chronicity of symptoms, per family 2 years, per son PCP thought aphasia was an acute change in mental status  -  given as patient is on ASA 81 mg at home  - Patient on high dose statin, continue formulary atorvastatin 80  - CT head negative for hemorrhage or mass or midline shift, CT angio : Negative for meaningful stenosis  - Mini Cog exam total : 0 , at this score <3 , likely element of dementia   - Follow up A1c  -lipid panel total cholesterol 117, LDL 73, HDL 31, Triglyceride 68  - B12 level 1734  -TSH 1.15  - Monitor on telemetry for arrythmia  - TTE ordered for the AM  - Passed Dysphagia screen with nursing, on DASH diet  - Speech and Swallow result is Regular solids with thin liquids, as tolerated  -PT ordered for AM  - MRI pending official read  - Consulted Neuro: Dr. Beavers

## 2020-08-07 NOTE — DISCHARGE NOTE PROVIDER - NSDCCPCAREPLAN_GEN_ALL_CORE_FT
PRINCIPAL DISCHARGE DIAGNOSIS  Diagnosis: TIA (transient ischemic attack)  Assessment and Plan of Treatment: You presented with an episode of altered mental status. We determined it was likely not due to a stroke or transient ischemic attack. It was likely progression of your ongoing dementia. please follow up with Dr. Galan within 2 weeks of discharge.   continue your aspirin 81 mg  continue your crestor 20 mg         SECONDARY DISCHARGE DIAGNOSES  Diagnosis: Kidney stone  Assessment and Plan of Treatment: Kidney stone: continue your ceforoxime as prescribed  - Continue your flomax 0.1 mg    Diagnosis: History of MI (myocardial infarction)  Assessment and Plan of Treatment: History of MI (myocardial infarction)   - continue your ramipril 5 mg       Diagnosis: TIA (transient ischemic attack)  Assessment and Plan of Treatment: PRINCIPAL DISCHARGE DIAGNOSIS  Diagnosis: TIA (transient ischemic attack)  Assessment and Plan of Treatment: You presented with an episode of altered mental status. We determined it was likely not due to a stroke or transient ischemic attack. It was likely progression of your ongoing mild cognitive impairment. please follow up with Dr. Galan within 2 weeks of discharge.   continue your aspirin 81 mg  continue your crestor 20 mg         SECONDARY DISCHARGE DIAGNOSES  Diagnosis: History of MI (myocardial infarction)  Assessment and Plan of Treatment: History of MI (myocardial infarction)   - continue your ramipril 5 mg   -Continue metoprolol 100mg daily      Diagnosis: Kidney stone  Assessment and Plan of Treatment: Kidney stone: continue your ceforoxime as prescribed  - Continue your flomax 0.1 mg

## 2020-08-07 NOTE — SWALLOW BEDSIDE ASSESSMENT ADULT - ASR SWALLOW ASPIRATION MONITOR
change of breathing pattern/position upright (90Y)/cough/throat clearing/upper respiratory infection/gurgly voice/oral hygiene/fever/pneumonia

## 2020-08-07 NOTE — DISCHARGE NOTE PROVIDER - NSDCMRMEDTOKEN_GEN_ALL_CORE_FT
aspirin 81 mg oral delayed release tablet: 1 tab(s) orally once a day  cefuroxime 250 mg oral tablet: 1 tab(s) orally every 12 hours   Crestor 20 mg oral tablet: 1 tab(s) orally once a day  Flomax 0.4 mg oral capsule: 1 cap(s) orally once a day   Metoprolol Succinate  mg oral tablet, extended release: 1 tab(s) orally once a day  ramipril 5 mg oral capsule: 1 cap(s) orally once a day

## 2020-08-07 NOTE — SWALLOW BEDSIDE ASSESSMENT ADULT - ADDITIONAL RECOMMENDATIONS
Consider a formal speech/language evaluation given abovementioned word-finding deficits and patient's report of ongoing difficulty "getting the right words out." This department to continue to follow during this admission, as schedule permits.

## 2020-08-07 NOTE — PROGRESS NOTE ADULT - PROBLEM SELECTOR PLAN 2
Possibly Medication induced bradycardia due to  Metoprolol succ 100 mg   - Per son, no known history of arrythmia, Prior EKG from June 16 2020 (ED for kidney stone) showed NSR @ 63   - Hold Metoprolol for now  - Monitor bradyarrhythmia on telemetry  - Consulted Cardio; [facility] Possibly Medication induced bradycardia due to  Metoprolol succ 100 mg   - Per son, no known history of arrythmia, Prior EKG from June 16 2020 (ED for kidney stone) showed NSR @ 63   - Hold Metoprolol for now  - Monitor bradyarrhythmia on telemetry  - Consulted Cardio; [facility]  - HR: 60s, stable, asymptomatic

## 2020-08-07 NOTE — DISCHARGE NOTE NURSING/CASE MANAGEMENT/SOCIAL WORK - PATIENT PORTAL LINK FT
You can access the FollowMyHealth Patient Portal offered by [facility] by registering at the following website: [email or web address]. By joining [employer] [facility]’s FollowMyHealth portal, you will also be able to view your health information using other applications (apps) compatible with our system.

## 2020-08-07 NOTE — DISCHARGE NOTE PROVIDER - HOSPITAL COURSE
FROM ADMISSION H+P:     HPI:    70 yo F PMHx hx of Remote Myocardial Infarction s/p PCI x 4, Anxiety (fearful of men), Multiple kidney stones, now presenting sent from PMD's office for aphasia. Son Cristian at beside. History given mostly by patient. Per son, his mother has had word finding difficulty, unable to finish sentences, and difficulty with executive tasks like word spelling for the past 2 years. He noted last week his nephew asked her how to spell "happy" and she could not tell him. Per son, she was working at [employer] this year without issues until COVID-19 and was furloughed. She lives alone, drives and performs all ADL's independently. Denies dizziness, falls, chest pain, shortness of breath, fevers/chills, or feeling ill.         In ED     VS 98.8 HR 56-60 /75 RR  16 SPO2 99 on RA    labs  Platelets 125     EKG: Sinus Dwayne 59    Imaging    < from: CT Angio Head w/ IV Cont (08.06.20 @ 20:35) >        1.   Right carotid system:  No hemodynamically significant stenosis.        2.   Left carotid system: No hemodynamically significant stenosis.        3.   Intracranial circulation:  No hemodynamically significant stenosis.        4.   Brain:  No acute infarct or hemorrhage.        Received (06 Aug 2020 20:30)            ---    HOSPITAL COURSE:     This patient was admitted for rule out TIA/CVA. She was started on atorvastatin and aspirin. An MRI was done and showed chronic microhemorrhages suggesting amyloid or hypertensive angiopathy. For the patient's bradycardia, metropolol was held and she remained asymptomatic.     ---    CONSULTANTS:         ---    TIME SPENT:    I, the attending physician, was physically present for the key portions of the evaluation and management (E/M) service provided. The total amount of time spent reviewing the hospital notes, laboratory values, imaging findings, assessing/counseling the patient, discussing with consultant physicians, social work, nursing staff was -- minutes        ---    Primary care provider was made aware of plan for discharge:      [  ] NO     [  ] YES FROM ADMISSION H+P:     HPI:    68 yo F PMHx hx of Remote Myocardial Infarction s/p PCI x 4, Anxiety (fearful of men), Multiple kidney stones, now presenting sent from PMD's office for aphasia. Son Cristian at beside. History given mostly by patient. Per son, his mother has had word finding difficulty, unable to finish sentences, and difficulty with executive tasks like word spelling for the past 2 years. He noted last week his nephew asked her how to spell "happy" and she could not tell him. Per son, she was working at [employer] this year without issues until COVID-19 and was furloughed. She lives alone, drives and performs all ADL's independently. Denies dizziness, falls, chest pain, shortness of breath, fevers/chills, or feeling ill.         In ED     VS 98.8 HR 56-60 /75 RR  16 SPO2 99 on RA    labs  Platelets 125     EKG: Sinus Dwayne 59    Imaging    < from: CT Angio Head w/ IV Cont (08.06.20 @ 20:35) >        1.   Right carotid system:  No hemodynamically significant stenosis.        2.   Left carotid system: No hemodynamically significant stenosis.        3.   Intracranial circulation:  No hemodynamically significant stenosis.        4.   Brain:  No acute infarct or hemorrhage.        Received (06 Aug 2020 20:30)            ---    HOSPITAL COURSE:     This patient was admitted for rule out TIA/CVA. She was started on atorvastatin and aspirin. An MRI was done and showed chronic microhemorrhages suggesting amyloid or hypertensive angiopathy. This episode was determined to be a progressive cognitive decline of her dementia and was recommended to follow up with PMD. For the patient's bradycardia, metropolol was held and she remained asymptomatic. Neurology was consulted. The patient remained hemodynamically stable and was medically optimized for discharge.      ---    CONSULTANTS:     Neurology: Nimesh     Cardiology: Darcy     ---    TIME SPENT:    I, the attending physician, was physically present for the key portions of the evaluation and management (E/M) service provided. The total amount of time spent reviewing the hospital notes, laboratory values, imaging findings, assessing/counseling the patient, discussing with consultant physicians, social work, nursing staff was -- minutes        ---    Primary care provider was made aware of plan for discharge:      [  ] NO     [  ] YES FROM ADMISSION H+P:     HPI:    70 yo F PMHx hx of Remote Myocardial Infarction s/p PCI x 4, Anxiety (fearful of men), Multiple kidney stones, now presenting sent from PMD's office for aphasia. Son Cristian at beside. History given mostly by patient. Per son, his mother has had word finding difficulty, unable to finish sentences, and difficulty with executive tasks like word spelling for the past 2 years. He noted last week his nephew asked her how to spell "happy" and she could not tell him. Per son, she was working at [employer] this year without issues until COVID-19 and was furloughed. She lives alone, drives and performs all ADL's independently. Denies dizziness, falls, chest pain, shortness of breath, fevers/chills, or feeling ill.         In ED     VS 98.8 HR 56-60 /75 RR  16 SPO2 99 on RA    labs  Platelets 125     EKG: Sinus Dwayne 59    Imaging    < from: CT Angio Head w/ IV Cont (08.06.20 @ 20:35) >        1.   Right carotid system:  No hemodynamically significant stenosis.        2.   Left carotid system: No hemodynamically significant stenosis.        3.   Intracranial circulation:  No hemodynamically significant stenosis.        4.   Brain:  No acute infarct or hemorrhage.        Received (06 Aug 2020 20:30)            ---    HOSPITAL COURSE:     This patient was admitted for rule out TIA/CVA. She was started on atorvastatin and aspirin. An MRI was done and showed chronic microhemorrhages suggesting amyloid or hypertensive angiopathy. This episode was determined to be a progressive cognitive decline of her dementia and was recommended to follow up with PMD. For the patient's bradycardia, metropolol was held and she remained asymptomatic. Neurology was consulted. The patient remained hemodynamically stable and was medically optimized for discharge.      ---    CONSULTANTS:     Neurology: Nimesh     Cardiology: Darcy     ---    TIME SPENT:    I, the attending physician, was physically present for the key portions of the evaluation and management (E/M) service provided. The total amount of time spent reviewing the hospital notes, laboratory values, imaging findings, assessing/counseling the patient, discussing with consultant physicians, social work, nursing staff was 35 minutes

## 2020-08-08 LAB
SARS-COV-2 IGG SERPL QL IA: NEGATIVE — SIGNIFICANT CHANGE UP
SARS-COV-2 IGM SERPL IA-ACNC: 0.02 INDEX — SIGNIFICANT CHANGE UP

## 2020-08-11 LAB — VIT B1 SERPL-MCNC: 129.8 NMOL/L — SIGNIFICANT CHANGE UP (ref 66.5–200)

## 2020-08-13 ENCOUNTER — EMERGENCY (EMERGENCY)
Facility: HOSPITAL | Age: 70
LOS: 1 days | Discharge: HOME | End: 2020-08-13
Attending: EMERGENCY MEDICINE | Admitting: EMERGENCY MEDICINE
Payer: COMMERCIAL

## 2020-08-13 VITALS
SYSTOLIC BLOOD PRESSURE: 154 MMHG | TEMPERATURE: 98 F | DIASTOLIC BLOOD PRESSURE: 76 MMHG | OXYGEN SATURATION: 97 % | HEART RATE: 62 BPM | RESPIRATION RATE: 18 BRPM

## 2020-08-13 VITALS
SYSTOLIC BLOOD PRESSURE: 155 MMHG | HEART RATE: 67 BPM | TEMPERATURE: 98 F | RESPIRATION RATE: 18 BRPM | HEIGHT: 65 IN | DIASTOLIC BLOOD PRESSURE: 85 MMHG | OXYGEN SATURATION: 99 %

## 2020-08-13 DIAGNOSIS — Z95.5 PRESENCE OF CORONARY ANGIOPLASTY IMPLANT AND GRAFT: Chronic | ICD-10-CM

## 2020-08-13 LAB
ALBUMIN SERPL ELPH-MCNC: 4 G/DL — SIGNIFICANT CHANGE UP (ref 3.3–5)
ALP SERPL-CCNC: 60 U/L — SIGNIFICANT CHANGE UP (ref 40–120)
ALT FLD-CCNC: 20 U/L — SIGNIFICANT CHANGE UP (ref 12–78)
ANION GAP SERPL CALC-SCNC: 8 MMOL/L — SIGNIFICANT CHANGE UP (ref 5–17)
APPEARANCE UR: CLEAR — SIGNIFICANT CHANGE UP
AST SERPL-CCNC: 23 U/L — SIGNIFICANT CHANGE UP (ref 15–37)
BASOPHILS # BLD AUTO: 0.02 K/UL — SIGNIFICANT CHANGE UP (ref 0–0.2)
BASOPHILS NFR BLD AUTO: 0.3 % — SIGNIFICANT CHANGE UP (ref 0–2)
BILIRUB SERPL-MCNC: 0.8 MG/DL — SIGNIFICANT CHANGE UP (ref 0.2–1.2)
BILIRUB UR-MCNC: NEGATIVE — SIGNIFICANT CHANGE UP
BUN SERPL-MCNC: 18 MG/DL — SIGNIFICANT CHANGE UP (ref 7–23)
CALCIUM SERPL-MCNC: 9.3 MG/DL — SIGNIFICANT CHANGE UP (ref 8.5–10.1)
CHLORIDE SERPL-SCNC: 112 MMOL/L — HIGH (ref 96–108)
CO2 SERPL-SCNC: 23 MMOL/L — SIGNIFICANT CHANGE UP (ref 22–31)
COLOR SPEC: SIGNIFICANT CHANGE UP
CREAT SERPL-MCNC: 0.86 MG/DL — SIGNIFICANT CHANGE UP (ref 0.5–1.3)
DIFF PNL FLD: ABNORMAL
EOSINOPHIL # BLD AUTO: 0.01 K/UL — SIGNIFICANT CHANGE UP (ref 0–0.5)
EOSINOPHIL NFR BLD AUTO: 0.2 % — SIGNIFICANT CHANGE UP (ref 0–6)
GLUCOSE SERPL-MCNC: 128 MG/DL — HIGH (ref 70–99)
GLUCOSE UR QL: NEGATIVE — SIGNIFICANT CHANGE UP
HCT VFR BLD CALC: 37.3 % — SIGNIFICANT CHANGE UP (ref 34.5–45)
HGB BLD-MCNC: 13 G/DL — SIGNIFICANT CHANGE UP (ref 11.5–15.5)
IMM GRANULOCYTES NFR BLD AUTO: 0.3 % — SIGNIFICANT CHANGE UP (ref 0–1.5)
KETONES UR-MCNC: ABNORMAL
LEUKOCYTE ESTERASE UR-ACNC: NEGATIVE — SIGNIFICANT CHANGE UP
LYMPHOCYTES # BLD AUTO: 0.73 K/UL — LOW (ref 1–3.3)
LYMPHOCYTES # BLD AUTO: 11 % — LOW (ref 13–44)
MCHC RBC-ENTMCNC: 30.8 PG — SIGNIFICANT CHANGE UP (ref 27–34)
MCHC RBC-ENTMCNC: 34.9 GM/DL — SIGNIFICANT CHANGE UP (ref 32–36)
MCV RBC AUTO: 88.4 FL — SIGNIFICANT CHANGE UP (ref 80–100)
MONOCYTES # BLD AUTO: 0.48 K/UL — SIGNIFICANT CHANGE UP (ref 0–0.9)
MONOCYTES NFR BLD AUTO: 7.3 % — SIGNIFICANT CHANGE UP (ref 2–14)
NEUTROPHILS # BLD AUTO: 5.36 K/UL — SIGNIFICANT CHANGE UP (ref 1.8–7.4)
NEUTROPHILS NFR BLD AUTO: 80.9 % — HIGH (ref 43–77)
NITRITE UR-MCNC: NEGATIVE — SIGNIFICANT CHANGE UP
NRBC # BLD: 0 /100 WBCS — SIGNIFICANT CHANGE UP (ref 0–0)
PH UR: 8 — SIGNIFICANT CHANGE UP (ref 5–8)
PLATELET # BLD AUTO: 155 K/UL — SIGNIFICANT CHANGE UP (ref 150–400)
POTASSIUM SERPL-MCNC: 3.7 MMOL/L — SIGNIFICANT CHANGE UP (ref 3.5–5.3)
POTASSIUM SERPL-SCNC: 3.7 MMOL/L — SIGNIFICANT CHANGE UP (ref 3.5–5.3)
PROT SERPL-MCNC: 7.1 G/DL — SIGNIFICANT CHANGE UP (ref 6–8.3)
PROT UR-MCNC: NEGATIVE — SIGNIFICANT CHANGE UP
RBC # BLD: 4.22 M/UL — SIGNIFICANT CHANGE UP (ref 3.8–5.2)
RBC # FLD: 12.3 % — SIGNIFICANT CHANGE UP (ref 10.3–14.5)
SODIUM SERPL-SCNC: 143 MMOL/L — SIGNIFICANT CHANGE UP (ref 135–145)
SP GR SPEC: 1.01 — SIGNIFICANT CHANGE UP (ref 1.01–1.02)
UROBILINOGEN FLD QL: NEGATIVE — SIGNIFICANT CHANGE UP
WBC # BLD: 6.62 K/UL — SIGNIFICANT CHANGE UP (ref 3.8–10.5)
WBC # FLD AUTO: 6.62 K/UL — SIGNIFICANT CHANGE UP (ref 3.8–10.5)

## 2020-08-13 PROCEDURE — 85027 COMPLETE CBC AUTOMATED: CPT

## 2020-08-13 PROCEDURE — 99283 EMERGENCY DEPT VISIT LOW MDM: CPT

## 2020-08-13 PROCEDURE — 96374 THER/PROPH/DIAG INJ IV PUSH: CPT

## 2020-08-13 PROCEDURE — 36415 COLL VENOUS BLD VENIPUNCTURE: CPT

## 2020-08-13 PROCEDURE — 99284 EMERGENCY DEPT VISIT MOD MDM: CPT | Mod: 25

## 2020-08-13 PROCEDURE — 81001 URINALYSIS AUTO W/SCOPE: CPT

## 2020-08-13 PROCEDURE — 80053 COMPREHEN METABOLIC PANEL: CPT

## 2020-08-13 PROCEDURE — 96361 HYDRATE IV INFUSION ADD-ON: CPT

## 2020-08-13 RX ORDER — IBUPROFEN 200 MG
1 TABLET ORAL
Qty: 20 | Refills: 0
Start: 2020-08-13 | End: 2020-08-17

## 2020-08-13 RX ORDER — OXYCODONE AND ACETAMINOPHEN 5; 325 MG/1; MG/1
1 TABLET ORAL ONCE
Refills: 0 | Status: DISCONTINUED | OUTPATIENT
Start: 2020-08-13 | End: 2020-08-13

## 2020-08-13 RX ORDER — SODIUM CHLORIDE 9 MG/ML
1000 INJECTION INTRAMUSCULAR; INTRAVENOUS; SUBCUTANEOUS ONCE
Refills: 0 | Status: COMPLETED | OUTPATIENT
Start: 2020-08-13 | End: 2020-08-13

## 2020-08-13 RX ORDER — KETOROLAC TROMETHAMINE 30 MG/ML
30 SYRINGE (ML) INJECTION ONCE
Refills: 0 | Status: DISCONTINUED | OUTPATIENT
Start: 2020-08-13 | End: 2020-08-13

## 2020-08-13 RX ADMIN — OXYCODONE AND ACETAMINOPHEN 1 TABLET(S): 5; 325 TABLET ORAL at 12:46

## 2020-08-13 RX ADMIN — Medication 30 MILLIGRAM(S): at 11:20

## 2020-08-13 RX ADMIN — SODIUM CHLORIDE 1000 MILLILITER(S): 9 INJECTION INTRAMUSCULAR; INTRAVENOUS; SUBCUTANEOUS at 11:20

## 2020-08-13 RX ADMIN — OXYCODONE AND ACETAMINOPHEN 1 TABLET(S): 5; 325 TABLET ORAL at 13:46

## 2020-08-13 RX ADMIN — SODIUM CHLORIDE 1000 MILLILITER(S): 9 INJECTION INTRAMUSCULAR; INTRAVENOUS; SUBCUTANEOUS at 12:20

## 2020-08-13 RX ADMIN — Medication 30 MILLIGRAM(S): at 11:35

## 2020-08-13 NOTE — ED ADULT NURSE NOTE - NSIMPLEMENTINTERV_GEN_ALL_ED
Implemented All Universal Safety Interventions:  [place] to call system. Call bell, personal items and telephone within reach. Instruct patient to call for assistance. Room bathroom lighting operational. Non-slip footwear when patient is off stretcher. Physically safe environment: no spills, clutter or unnecessary equipment. Stretcher in lowest position, wheels locked, appropriate side rails in place.

## 2020-08-13 NOTE — ED PROVIDER NOTE - CLINICAL SUMMARY MEDICAL DECISION MAKING FREE TEXT BOX
68 y/o F with PMH kidneys stones with 3mm L ureter stone diagnosed on 8/5 presents with LLQ abd pain, pain is intermittent and sharp, afebrilel nontoxic appearing, abd soft nontender no CVAt,  impression: renal colic- will rpt labs nd urine and control pain, to be d/c home with follow up

## 2020-08-13 NOTE — ED PROVIDER NOTE - PROGRESS NOTE DETAILS
Reevaluated patient at bedside.  Patient feeling much improved.  Tolerating PO. Discussed the results of all diagnostic testing in ED and copies of all reports given.   An opportunity to ask questions was given.  Discussed the importance of prompt, close medical follow-up.  Patient will return with any changes, concerns or persistent / worsening symptoms.  Understanding of all instructions verbalized. Daughter arrived to ED. Discussed findings and plan of care. Aware pt needs to f/u with urology, to strain urine, drink plenty of fluids. Percocet for severe pain, motrin, tylenol for moderate pain. Return to ED for efver chills gross hematuria inability to urinate. To contiuesd flomax and abx as previouslty percribed. Also given copies of last 2 CT abdomen/ pelvis and renal stone hunt. Incidental lung nodules discussed  and need for cpt CT in 6 months. Daughter vindicated understanding.

## 2020-08-13 NOTE — ED PROVIDER NOTE - CARE PROVIDER_API CALL
Jb Meade)  Urology  [address]  [place] [address]  Phone: [phone]  Fax: [phone]  Follow Up Time:

## 2020-08-13 NOTE — ED ADULT NURSE NOTE - OBJECTIVE STATEMENT
pt states "my gallstones are hurting" pt here recently for same complaints, pt denies fever, nausea or vomiting or diarrhea

## 2020-08-13 NOTE — ED PROVIDER NOTE - NSFOLLOWUPINSTRUCTIONS_ED_ALL_ED_FT
1. TAKE ALL MEDICATIONS AS DIRECTED.  FOR PAIN YOU CAN TAKE IBUPROFEN (MOTRIN, ADVIL) OR ACETAMINOPHEN (TYLENOL) AS NEEDED, AS DIRECTED ON PACKAGING. FOR SEVERE PAIN YOU CAN TAKE ONE PERCOCET EVERY 6 HOURS AS NEEDED.   2. FOLLOW UP WITH ___UROLOGY_______ AS DIRECTED.  YOU WERE GIVEN COPIES OF ALL LABS AND IMAGING RESULTS FROM YOUR ER VISIT--PLEASE TAKE THEM WITH YOU TO YOUR APPOINTMENT.  3. RETURN TO THE ER FOR ANY NEW OR WORSENING SYMPTOMS.      Kidney Stones    Kidney stones (urolithiasis) are crystal deposits that form inside your kidneys. Pain is caused by the stone moving through the urinary tract, causing spasms of the ureter. Drink enough water and fluids to keep your urine clear or pale yellow. This will help you to pass the stone or stone fragments. If provided a strainer, strain all urine and keep all particulate matter and stones for a follow up appointment with a urologist.    SEEK IMMEDIATE MEDICAL CARE IF YOU HAVE ANY OF THE FOLLOWING SYMPTOMS: pain not controlled with medication, fever/chills, worsening vomiting, inability to urinate, or dizziness/lightheadedness.

## 2020-08-13 NOTE — ED PROVIDER NOTE - PATIENT PORTAL LINK FT
You can access the FollowMyHealth Patient Portal offered by [facility] [place] by registering at the following website: [email or web address]. By joining [facility]’s FollowMyHealth portal, you will also be able to view your health information using other applications (apps) compatible with our system.

## 2020-08-13 NOTE — ED PROVIDER NOTE - OBJECTIVE STATEMENT
70 y/o F with PMH anxiety, dementia, kidneys stones, MI s/p PCI presents to ED for co LLQ pain. Pt has hx kidney stones, was seen here on 8/5 and has 3mm L distal ureter stone, is on flomax and ceftin. Pt states the pain is sharp and intermittent, At its worst rates pain 10/10 did not take anything for pain at home. The pain was so severe this am it made her nausea and she vomited x1. Pt repots "it was just bile". Denies hematuria or dysuria but reports urinating more frequently (likely due to flomax?) Denies diarrhea, constipation, fever, chills,, cough, CP, SOB, rash.     PCP; Dr Galan

## 2020-08-13 NOTE — ED PROVIDER NOTE - ATTENDING CONTRIBUTION TO CARE
68 yo white female with left renal colic Dx few days ago now with recurrence of same pain, acute onset last evening, non radiating with slight nausea but no fever or chills. Exam revealed white female in NAD with normal exam and completely normal. I agree with plan and management outlined by PA.

## 2020-08-21 NOTE — ED PROVIDER NOTE - LOCATION
left hand Nsaids Pregnancy And Lactation Text: These medications are considered safe up to 30 weeks gestation. It is excreted in breast milk.

## 2021-08-24 NOTE — ED ADULT TRIAGE NOTE - NS ED NURSE BANDS TYPE
HPI:   Felisa Carpenter is a 49year old female who presents for a complete physical exam.    Pt reports taking powder veggie supplement and not sure if helped total cholesterol. Has order for mammogram  Not exercising much.    Gets rash under breasts and Name band; Comment: 2 drinks daily    Drug use: No         REVIEW OF SYSTEMS:   GENERAL: feels well otherwise  SKIN: denies any skin lesions  EYES:denies blurred vision or double vision  HEENT: denies nasal congestion, sinus pain or ST  LUNGS: denies shortness of b

## 2021-11-26 NOTE — ED ADULT NURSE NOTE - NSFALLRSKASSESSTYPE_ED_ALL_ED
Initial (On Arrival)
23 y/o female with no sig PMHx c/o fever. pt states fever 101.5 began last night around 10:30pm. Pt notes chills and body aches as well. pt went to [facility] MD this am and fever 102, negative rapid covid and rapid flu and sent to ED. no cough, cp, sob, or wheezing. no sore throat, earache or ha. no nasal congestion. no abd pain, n/v/d. no urinary sx's. pt admits travel for wedding last week to [place].

## 2022-04-06 NOTE — H&P ADULT - NSHPSOCIALHISTORY_GEN_ALL_CORE
clears
Lives alone, ,  works at [employer], though has been furloughed due to COVID -19   Denies smoking, etoh or illicit drug use

## 2023-01-01 ENCOUNTER — INPATIENT (INPATIENT)
Facility: HOSPITAL | Age: 73
LOS: 0 days | DRG: 64 | End: 2023-05-25
Attending: PSYCHIATRY & NEUROLOGY | Admitting: PSYCHIATRY & NEUROLOGY
Payer: COMMERCIAL

## 2023-01-01 ENCOUNTER — EMERGENCY (EMERGENCY)
Facility: HOSPITAL | Age: 73
LOS: 1 days | Discharge: HOME | End: 2023-01-01
Attending: EMERGENCY MEDICINE | Admitting: EMERGENCY MEDICINE
Payer: COMMERCIAL

## 2023-01-01 ENCOUNTER — EMERGENCY (EMERGENCY)
Facility: HOSPITAL | Age: 73
LOS: 1 days | Discharge: ANOTHER HOSPITAL | End: 2023-01-01
Attending: EMERGENCY MEDICINE | Admitting: EMERGENCY MEDICINE
Payer: COMMERCIAL

## 2023-01-01 VITALS
DIASTOLIC BLOOD PRESSURE: 82 MMHG | RESPIRATION RATE: 18 BRPM | TEMPERATURE: 97 F | OXYGEN SATURATION: 98 % | SYSTOLIC BLOOD PRESSURE: 189 MMHG | HEART RATE: 66 BPM | WEIGHT: 141.76 LBS

## 2023-01-01 VITALS
SYSTOLIC BLOOD PRESSURE: 109 MMHG | OXYGEN SATURATION: 100 % | DIASTOLIC BLOOD PRESSURE: 71 MMHG | RESPIRATION RATE: 18 BRPM | HEART RATE: 83 BPM

## 2023-01-01 VITALS
SYSTOLIC BLOOD PRESSURE: 145 MMHG | HEART RATE: 58 BPM | OXYGEN SATURATION: 96 % | RESPIRATION RATE: 18 BRPM | DIASTOLIC BLOOD PRESSURE: 75 MMHG | TEMPERATURE: 98 F

## 2023-01-01 VITALS
RESPIRATION RATE: 22 BRPM | HEART RATE: 95 BPM | SYSTOLIC BLOOD PRESSURE: 153 MMHG | DIASTOLIC BLOOD PRESSURE: 88 MMHG | OXYGEN SATURATION: 100 % | TEMPERATURE: 98 F

## 2023-01-01 VITALS
SYSTOLIC BLOOD PRESSURE: 214 MMHG | RESPIRATION RATE: 20 BRPM | DIASTOLIC BLOOD PRESSURE: 109 MMHG | OXYGEN SATURATION: 96 % | HEART RATE: 98 BPM | TEMPERATURE: 97 F

## 2023-01-01 VITALS — TEMPERATURE: 100 F

## 2023-01-01 DIAGNOSIS — Z95.5 PRESENCE OF CORONARY ANGIOPLASTY IMPLANT AND GRAFT: Chronic | ICD-10-CM

## 2023-01-01 DIAGNOSIS — I62.9 NONTRAUMATIC INTRACRANIAL HEMORRHAGE, UNSPECIFIED: ICD-10-CM

## 2023-01-01 LAB
A1C WITH ESTIMATED AVERAGE GLUCOSE RESULT: 5.7 % — HIGH (ref 4–5.6)
ALBUMIN SERPL ELPH-MCNC: 3.7 G/DL — SIGNIFICANT CHANGE UP (ref 3.3–5)
ALBUMIN SERPL ELPH-MCNC: 3.9 G/DL — SIGNIFICANT CHANGE UP (ref 3.3–5)
ALP SERPL-CCNC: 61 U/L — SIGNIFICANT CHANGE UP (ref 40–120)
ALP SERPL-CCNC: 74 U/L — SIGNIFICANT CHANGE UP (ref 40–120)
ALT FLD-CCNC: 21 U/L — SIGNIFICANT CHANGE UP (ref 12–78)
ALT FLD-CCNC: 24 U/L — SIGNIFICANT CHANGE UP (ref 12–78)
ANION GAP SERPL CALC-SCNC: 12 MMOL/L — SIGNIFICANT CHANGE UP (ref 5–17)
ANION GAP SERPL CALC-SCNC: 15 MMOL/L — SIGNIFICANT CHANGE UP (ref 5–17)
ANION GAP SERPL CALC-SCNC: 18 MMOL/L — HIGH (ref 5–17)
ANION GAP SERPL CALC-SCNC: 5 MMOL/L — SIGNIFICANT CHANGE UP (ref 5–17)
ANION GAP SERPL CALC-SCNC: 6 MMOL/L — SIGNIFICANT CHANGE UP (ref 5–17)
APPEARANCE UR: ABNORMAL
APTT BLD: 26.6 SEC — LOW (ref 27.5–35.5)
AST SERPL-CCNC: 18 U/L — SIGNIFICANT CHANGE UP (ref 15–37)
AST SERPL-CCNC: 24 U/L — SIGNIFICANT CHANGE UP (ref 15–37)
BASE EXCESS BLDA CALC-SCNC: -4.2 MMOL/L — LOW (ref -2–3)
BASOPHILS # BLD AUTO: 0.02 K/UL — SIGNIFICANT CHANGE UP (ref 0–0.2)
BASOPHILS # BLD AUTO: 0.05 K/UL — SIGNIFICANT CHANGE UP (ref 0–0.2)
BASOPHILS NFR BLD AUTO: 0.3 % — SIGNIFICANT CHANGE UP (ref 0–2)
BASOPHILS NFR BLD AUTO: 0.5 % — SIGNIFICANT CHANGE UP (ref 0–2)
BILIRUB SERPL-MCNC: 0.5 MG/DL — SIGNIFICANT CHANGE UP (ref 0.2–1.2)
BILIRUB SERPL-MCNC: 0.7 MG/DL — SIGNIFICANT CHANGE UP (ref 0.2–1.2)
BILIRUB UR-MCNC: NEGATIVE — SIGNIFICANT CHANGE UP
BLOOD GAS COMMENTS ARTERIAL: SIGNIFICANT CHANGE UP
BUN SERPL-MCNC: 10.6 MG/DL — SIGNIFICANT CHANGE UP (ref 8–20)
BUN SERPL-MCNC: 13.9 MG/DL — SIGNIFICANT CHANGE UP (ref 8–20)
BUN SERPL-MCNC: 18 MG/DL — SIGNIFICANT CHANGE UP (ref 7–23)
BUN SERPL-MCNC: 21 MG/DL — HIGH (ref 8–20)
BUN SERPL-MCNC: 22 MG/DL — SIGNIFICANT CHANGE UP (ref 7–23)
CALCIUM SERPL-MCNC: 8.5 MG/DL — SIGNIFICANT CHANGE UP (ref 8.5–10.1)
CALCIUM SERPL-MCNC: 8.6 MG/DL — SIGNIFICANT CHANGE UP (ref 8.4–10.5)
CALCIUM SERPL-MCNC: 8.8 MG/DL — SIGNIFICANT CHANGE UP (ref 8.4–10.5)
CALCIUM SERPL-MCNC: 8.8 MG/DL — SIGNIFICANT CHANGE UP (ref 8.4–10.5)
CALCIUM SERPL-MCNC: 9.1 MG/DL — SIGNIFICANT CHANGE UP (ref 8.5–10.1)
CHLORIDE SERPL-SCNC: 103 MMOL/L — SIGNIFICANT CHANGE UP (ref 96–108)
CHLORIDE SERPL-SCNC: 104 MMOL/L — SIGNIFICANT CHANGE UP (ref 96–108)
CHLORIDE SERPL-SCNC: 105 MMOL/L — SIGNIFICANT CHANGE UP (ref 96–108)
CHLORIDE SERPL-SCNC: 109 MMOL/L — HIGH (ref 96–108)
CHLORIDE SERPL-SCNC: 118 MMOL/L — HIGH (ref 96–108)
CO2 SERPL-SCNC: 18 MMOL/L — LOW (ref 22–29)
CO2 SERPL-SCNC: 19 MMOL/L — LOW (ref 22–29)
CO2 SERPL-SCNC: 21 MMOL/L — LOW (ref 22–29)
CO2 SERPL-SCNC: 24 MMOL/L — SIGNIFICANT CHANGE UP (ref 22–31)
CO2 SERPL-SCNC: 25 MMOL/L — SIGNIFICANT CHANGE UP (ref 22–31)
COLOR SPEC: YELLOW — SIGNIFICANT CHANGE UP
CREAT SERPL-MCNC: 0.38 MG/DL — LOW (ref 0.5–1.3)
CREAT SERPL-MCNC: 0.47 MG/DL — LOW (ref 0.5–1.3)
CREAT SERPL-MCNC: 0.51 MG/DL — SIGNIFICANT CHANGE UP (ref 0.5–1.3)
CREAT SERPL-MCNC: 0.57 MG/DL — SIGNIFICANT CHANGE UP (ref 0.5–1.3)
CREAT SERPL-MCNC: 0.66 MG/DL — SIGNIFICANT CHANGE UP (ref 0.5–1.3)
CULTURE RESULTS: SIGNIFICANT CHANGE UP
DIFF PNL FLD: NEGATIVE — SIGNIFICANT CHANGE UP
EGFR: 100 ML/MIN/1.73M2 — SIGNIFICANT CHANGE UP
EGFR: 102 ML/MIN/1.73M2 — SIGNIFICANT CHANGE UP
EGFR: 107 ML/MIN/1.73M2 — SIGNIFICANT CHANGE UP
EGFR: 94 ML/MIN/1.73M2 — SIGNIFICANT CHANGE UP
EGFR: 97 ML/MIN/1.73M2 — SIGNIFICANT CHANGE UP
EOSINOPHIL # BLD AUTO: 0.01 K/UL — SIGNIFICANT CHANGE UP (ref 0–0.5)
EOSINOPHIL # BLD AUTO: 0.2 K/UL — SIGNIFICANT CHANGE UP (ref 0–0.5)
EOSINOPHIL NFR BLD AUTO: 0.2 % — SIGNIFICANT CHANGE UP (ref 0–6)
EOSINOPHIL NFR BLD AUTO: 1.9 % — SIGNIFICANT CHANGE UP (ref 0–6)
ESTIMATED AVERAGE GLUCOSE: 117 MG/DL — HIGH (ref 68–114)
FLUAV AG NPH QL: SIGNIFICANT CHANGE UP
FLUBV AG NPH QL: SIGNIFICANT CHANGE UP
GAS PNL BLDA: SIGNIFICANT CHANGE UP
GLUCOSE BLDC GLUCOMTR-MCNC: 127 MG/DL — HIGH (ref 70–99)
GLUCOSE BLDC GLUCOMTR-MCNC: 136 MG/DL — HIGH (ref 70–99)
GLUCOSE BLDC GLUCOMTR-MCNC: 178 MG/DL — HIGH (ref 70–99)
GLUCOSE SERPL-MCNC: 117 MG/DL — HIGH (ref 70–99)
GLUCOSE SERPL-MCNC: 124 MG/DL — HIGH (ref 70–99)
GLUCOSE SERPL-MCNC: 159 MG/DL — HIGH (ref 70–99)
GLUCOSE SERPL-MCNC: 233 MG/DL — HIGH (ref 70–99)
GLUCOSE SERPL-MCNC: 322 MG/DL — HIGH (ref 70–99)
GLUCOSE UR QL: NEGATIVE — SIGNIFICANT CHANGE UP
HCO3 BLDA-SCNC: 19 MMOL/L — LOW (ref 21–28)
HCT VFR BLD CALC: 38.9 % — SIGNIFICANT CHANGE UP (ref 34.5–45)
HCT VFR BLD CALC: 41.4 % — SIGNIFICANT CHANGE UP (ref 34.5–45)
HCT VFR BLD CALC: 42.6 % — SIGNIFICANT CHANGE UP (ref 34.5–45)
HCT VFR BLD CALC: 43.5 % — SIGNIFICANT CHANGE UP (ref 34.5–45)
HCV AB S/CO SERPL IA: 0.1 S/CO — SIGNIFICANT CHANGE UP (ref 0–0.99)
HCV AB SERPL-IMP: SIGNIFICANT CHANGE UP
HGB BLD-MCNC: 13.2 G/DL — SIGNIFICANT CHANGE UP (ref 11.5–15.5)
HGB BLD-MCNC: 13.9 G/DL — SIGNIFICANT CHANGE UP (ref 11.5–15.5)
HGB BLD-MCNC: 14.2 G/DL — SIGNIFICANT CHANGE UP (ref 11.5–15.5)
HGB BLD-MCNC: 14.7 G/DL — SIGNIFICANT CHANGE UP (ref 11.5–15.5)
HOROWITZ INDEX BLDA+IHG-RTO: SIGNIFICANT CHANGE UP
IMM GRANULOCYTES NFR BLD AUTO: 0.3 % — SIGNIFICANT CHANGE UP (ref 0–0.9)
IMM GRANULOCYTES NFR BLD AUTO: 0.8 % — SIGNIFICANT CHANGE UP (ref 0–0.9)
INR BLD: 0.98 RATIO — SIGNIFICANT CHANGE UP (ref 0.88–1.16)
KETONES UR-MCNC: ABNORMAL
LACTATE SERPL-SCNC: 4.5 MMOL/L — CRITICAL HIGH (ref 0.5–2)
LEUKOCYTE ESTERASE UR-ACNC: NEGATIVE — SIGNIFICANT CHANGE UP
LYMPHOCYTES # BLD AUTO: 0.64 K/UL — LOW (ref 1–3.3)
LYMPHOCYTES # BLD AUTO: 10 % — LOW (ref 13–44)
LYMPHOCYTES # BLD AUTO: 2.4 K/UL — SIGNIFICANT CHANGE UP (ref 1–3.3)
LYMPHOCYTES # BLD AUTO: 22.7 % — SIGNIFICANT CHANGE UP (ref 13–44)
MAGNESIUM SERPL-MCNC: 1.8 MG/DL — SIGNIFICANT CHANGE UP (ref 1.8–2.6)
MAGNESIUM SERPL-MCNC: 1.9 MG/DL — SIGNIFICANT CHANGE UP (ref 1.6–2.6)
MAGNESIUM SERPL-MCNC: 1.9 MG/DL — SIGNIFICANT CHANGE UP (ref 1.8–2.6)
MCHC RBC-ENTMCNC: 30.6 PG — SIGNIFICANT CHANGE UP (ref 27–34)
MCHC RBC-ENTMCNC: 30.8 PG — SIGNIFICANT CHANGE UP (ref 27–34)
MCHC RBC-ENTMCNC: 31 PG — SIGNIFICANT CHANGE UP (ref 27–34)
MCHC RBC-ENTMCNC: 31.1 PG — SIGNIFICANT CHANGE UP (ref 27–34)
MCHC RBC-ENTMCNC: 32.6 GM/DL — SIGNIFICANT CHANGE UP (ref 32–36)
MCHC RBC-ENTMCNC: 33.8 GM/DL — SIGNIFICANT CHANGE UP (ref 32–36)
MCHC RBC-ENTMCNC: 33.9 GM/DL — SIGNIFICANT CHANGE UP (ref 32–36)
MCHC RBC-ENTMCNC: 34.3 GM/DL — SIGNIFICANT CHANGE UP (ref 32–36)
MCV RBC AUTO: 90.4 FL — SIGNIFICANT CHANGE UP (ref 80–100)
MCV RBC AUTO: 90.8 FL — SIGNIFICANT CHANGE UP (ref 80–100)
MCV RBC AUTO: 90.9 FL — SIGNIFICANT CHANGE UP (ref 80–100)
MCV RBC AUTO: 94.9 FL — SIGNIFICANT CHANGE UP (ref 80–100)
MONOCYTES # BLD AUTO: 0.19 K/UL — SIGNIFICANT CHANGE UP (ref 0–0.9)
MONOCYTES # BLD AUTO: 0.46 K/UL — SIGNIFICANT CHANGE UP (ref 0–0.9)
MONOCYTES NFR BLD AUTO: 3 % — SIGNIFICANT CHANGE UP (ref 2–14)
MONOCYTES NFR BLD AUTO: 4.4 % — SIGNIFICANT CHANGE UP (ref 2–14)
MRSA PCR RESULT.: SIGNIFICANT CHANGE UP
NEUTROPHILS # BLD AUTO: 5.51 K/UL — SIGNIFICANT CHANGE UP (ref 1.8–7.4)
NEUTROPHILS # BLD AUTO: 7.36 K/UL — SIGNIFICANT CHANGE UP (ref 1.8–7.4)
NEUTROPHILS NFR BLD AUTO: 69.7 % — SIGNIFICANT CHANGE UP (ref 43–77)
NEUTROPHILS NFR BLD AUTO: 86.2 % — HIGH (ref 43–77)
NITRITE UR-MCNC: NEGATIVE — SIGNIFICANT CHANGE UP
NRBC # BLD: 0 /100 WBCS — SIGNIFICANT CHANGE UP (ref 0–0)
NRBC # BLD: 0 /100 WBCS — SIGNIFICANT CHANGE UP (ref 0–0)
PCO2 BLDA: 25 MMHG — LOW (ref 32–45)
PH BLDA: 7.49 — HIGH (ref 7.35–7.45)
PH UR: 8 — SIGNIFICANT CHANGE UP (ref 5–8)
PHOSPHATE SERPL-MCNC: 2.1 MG/DL — LOW (ref 2.4–4.7)
PHOSPHATE SERPL-MCNC: 2.8 MG/DL — SIGNIFICANT CHANGE UP (ref 2.4–4.7)
PLATELET # BLD AUTO: 147 K/UL — LOW (ref 150–400)
PLATELET # BLD AUTO: 155 K/UL — SIGNIFICANT CHANGE UP (ref 150–400)
PLATELET # BLD AUTO: 163 K/UL — SIGNIFICANT CHANGE UP (ref 150–400)
PLATELET # BLD AUTO: 198 K/UL — SIGNIFICANT CHANGE UP (ref 150–400)
PO2 BLDA: 191 MMHG — HIGH (ref 83–108)
POTASSIUM SERPL-MCNC: 2.6 MMOL/L — CRITICAL LOW (ref 3.5–5.3)
POTASSIUM SERPL-MCNC: 3 MMOL/L — LOW (ref 3.5–5.3)
POTASSIUM SERPL-MCNC: 3.6 MMOL/L — SIGNIFICANT CHANGE UP (ref 3.5–5.3)
POTASSIUM SERPL-MCNC: 3.8 MMOL/L — SIGNIFICANT CHANGE UP (ref 3.5–5.3)
POTASSIUM SERPL-MCNC: 4.4 MMOL/L — SIGNIFICANT CHANGE UP (ref 3.5–5.3)
POTASSIUM SERPL-SCNC: 2.6 MMOL/L — CRITICAL LOW (ref 3.5–5.3)
POTASSIUM SERPL-SCNC: 3 MMOL/L — LOW (ref 3.5–5.3)
POTASSIUM SERPL-SCNC: 3.6 MMOL/L — SIGNIFICANT CHANGE UP (ref 3.5–5.3)
POTASSIUM SERPL-SCNC: 3.8 MMOL/L — SIGNIFICANT CHANGE UP (ref 3.5–5.3)
POTASSIUM SERPL-SCNC: 4.4 MMOL/L — SIGNIFICANT CHANGE UP (ref 3.5–5.3)
PROT SERPL-MCNC: 7.1 G/DL — SIGNIFICANT CHANGE UP (ref 6–8.3)
PROT SERPL-MCNC: 7.8 G/DL — SIGNIFICANT CHANGE UP (ref 6–8.3)
PROT UR-MCNC: 15
PROTHROM AB SERPL-ACNC: 11.5 SEC — SIGNIFICANT CHANGE UP (ref 10.5–13.4)
RBC # BLD: 4.28 M/UL — SIGNIFICANT CHANGE UP (ref 3.8–5.2)
RBC # BLD: 4.49 M/UL — SIGNIFICANT CHANGE UP (ref 3.8–5.2)
RBC # BLD: 4.56 M/UL — SIGNIFICANT CHANGE UP (ref 3.8–5.2)
RBC # BLD: 4.81 M/UL — SIGNIFICANT CHANGE UP (ref 3.8–5.2)
RBC # FLD: 12.4 % — SIGNIFICANT CHANGE UP (ref 10.3–14.5)
RBC # FLD: 12.4 % — SIGNIFICANT CHANGE UP (ref 10.3–14.5)
RBC # FLD: 12.6 % — SIGNIFICANT CHANGE UP (ref 10.3–14.5)
RBC # FLD: 12.7 % — SIGNIFICANT CHANGE UP (ref 10.3–14.5)
RSV RNA NPH QL NAA+NON-PROBE: SIGNIFICANT CHANGE UP
S AUREUS DNA NOSE QL NAA+PROBE: SIGNIFICANT CHANGE UP
SAO2 % BLDA: 100 % — HIGH (ref 94–98)
SARS-COV-2 RNA SPEC QL NAA+PROBE: SIGNIFICANT CHANGE UP
SODIUM SERPL-SCNC: 135 MMOL/L — SIGNIFICANT CHANGE UP (ref 135–145)
SODIUM SERPL-SCNC: 138 MMOL/L — SIGNIFICANT CHANGE UP (ref 135–145)
SODIUM SERPL-SCNC: 139 MMOL/L — SIGNIFICANT CHANGE UP (ref 135–145)
SODIUM SERPL-SCNC: 139 MMOL/L — SIGNIFICANT CHANGE UP (ref 135–145)
SODIUM SERPL-SCNC: 151 MMOL/L — HIGH (ref 135–145)
SP GR SPEC: 1.01 — SIGNIFICANT CHANGE UP (ref 1.01–1.02)
SPECIMEN SOURCE: SIGNIFICANT CHANGE UP
TROPONIN I, HIGH SENSITIVITY RESULT: 5.2 NG/L — SIGNIFICANT CHANGE UP
TROPONIN I, HIGH SENSITIVITY RESULT: 7.1 NG/L — SIGNIFICANT CHANGE UP
UROBILINOGEN FLD QL: NEGATIVE — SIGNIFICANT CHANGE UP
WBC # BLD: 10.55 K/UL — HIGH (ref 3.8–10.5)
WBC # BLD: 13 K/UL — HIGH (ref 3.8–10.5)
WBC # BLD: 13.7 K/UL — HIGH (ref 3.8–10.5)
WBC # BLD: 6.39 K/UL — SIGNIFICANT CHANGE UP (ref 3.8–10.5)
WBC # FLD AUTO: 10.55 K/UL — HIGH (ref 3.8–10.5)
WBC # FLD AUTO: 13 K/UL — HIGH (ref 3.8–10.5)
WBC # FLD AUTO: 13.7 K/UL — HIGH (ref 3.8–10.5)
WBC # FLD AUTO: 6.39 K/UL — SIGNIFICANT CHANGE UP (ref 3.8–10.5)

## 2023-01-01 PROCEDURE — 70496 CT ANGIOGRAPHY HEAD: CPT | Mod: MA

## 2023-01-01 PROCEDURE — 36415 COLL VENOUS BLD VENIPUNCTURE: CPT

## 2023-01-01 PROCEDURE — 93005 ELECTROCARDIOGRAM TRACING: CPT

## 2023-01-01 PROCEDURE — 71045 X-RAY EXAM CHEST 1 VIEW: CPT

## 2023-01-01 PROCEDURE — 94002 VENT MGMT INPAT INIT DAY: CPT

## 2023-01-01 PROCEDURE — 83036 HEMOGLOBIN GLYCOSYLATED A1C: CPT

## 2023-01-01 PROCEDURE — 83735 ASSAY OF MAGNESIUM: CPT

## 2023-01-01 PROCEDURE — G1004: CPT

## 2023-01-01 PROCEDURE — 82330 ASSAY OF CALCIUM: CPT

## 2023-01-01 PROCEDURE — 80048 BASIC METABOLIC PNL TOTAL CA: CPT

## 2023-01-01 PROCEDURE — 74018 RADEX ABDOMEN 1 VIEW: CPT

## 2023-01-01 PROCEDURE — 87086 URINE CULTURE/COLONY COUNT: CPT

## 2023-01-01 PROCEDURE — 85730 THROMBOPLASTIN TIME PARTIAL: CPT

## 2023-01-01 PROCEDURE — 87641 MR-STAPH DNA AMP PROBE: CPT

## 2023-01-01 PROCEDURE — 99291 CRITICAL CARE FIRST HOUR: CPT

## 2023-01-01 PROCEDURE — 93010 ELECTROCARDIOGRAM REPORT: CPT

## 2023-01-01 PROCEDURE — 85610 PROTHROMBIN TIME: CPT

## 2023-01-01 PROCEDURE — 70450 CT HEAD/BRAIN W/O DYE: CPT | Mod: MA

## 2023-01-01 PROCEDURE — 99285 EMERGENCY DEPT VISIT HI MDM: CPT

## 2023-01-01 PROCEDURE — 87637 SARSCOV2&INF A&B&RSV AMP PRB: CPT

## 2023-01-01 PROCEDURE — 82803 BLOOD GASES ANY COMBINATION: CPT

## 2023-01-01 PROCEDURE — 96374 THER/PROPH/DIAG INJ IV PUSH: CPT

## 2023-01-01 PROCEDURE — 99291 CRITICAL CARE FIRST HOUR: CPT | Mod: 25

## 2023-01-01 PROCEDURE — 82435 ASSAY OF BLOOD CHLORIDE: CPT

## 2023-01-01 PROCEDURE — 70450 CT HEAD/BRAIN W/O DYE: CPT | Mod: 26,MG

## 2023-01-01 PROCEDURE — 85018 HEMOGLOBIN: CPT

## 2023-01-01 PROCEDURE — 71045 X-RAY EXAM CHEST 1 VIEW: CPT | Mod: 26

## 2023-01-01 PROCEDURE — 81001 URINALYSIS AUTO W/SCOPE: CPT

## 2023-01-01 PROCEDURE — 82962 GLUCOSE BLOOD TEST: CPT

## 2023-01-01 PROCEDURE — 80053 COMPREHEN METABOLIC PANEL: CPT

## 2023-01-01 PROCEDURE — 83605 ASSAY OF LACTIC ACID: CPT

## 2023-01-01 PROCEDURE — 96375 TX/PRO/DX INJ NEW DRUG ADDON: CPT

## 2023-01-01 PROCEDURE — 85027 COMPLETE CBC AUTOMATED: CPT

## 2023-01-01 PROCEDURE — 87640 STAPH A DNA AMP PROBE: CPT

## 2023-01-01 PROCEDURE — 85025 COMPLETE CBC W/AUTO DIFF WBC: CPT

## 2023-01-01 PROCEDURE — 84100 ASSAY OF PHOSPHORUS: CPT

## 2023-01-01 PROCEDURE — 86803 HEPATITIS C AB TEST: CPT

## 2023-01-01 PROCEDURE — 94003 VENT MGMT INPAT SUBQ DAY: CPT

## 2023-01-01 PROCEDURE — 70450 CT HEAD/BRAIN W/O DYE: CPT | Mod: 26,MA

## 2023-01-01 PROCEDURE — 70498 CT ANGIOGRAPHY NECK: CPT | Mod: MA

## 2023-01-01 PROCEDURE — 85014 HEMATOCRIT: CPT

## 2023-01-01 PROCEDURE — 70450 CT HEAD/BRAIN W/O DYE: CPT | Mod: MG

## 2023-01-01 PROCEDURE — 82947 ASSAY GLUCOSE BLOOD QUANT: CPT

## 2023-01-01 PROCEDURE — 84484 ASSAY OF TROPONIN QUANT: CPT

## 2023-01-01 PROCEDURE — 84132 ASSAY OF SERUM POTASSIUM: CPT

## 2023-01-01 PROCEDURE — 84295 ASSAY OF SERUM SODIUM: CPT

## 2023-01-01 PROCEDURE — 99285 EMERGENCY DEPT VISIT HI MDM: CPT | Mod: 25

## 2023-01-01 RX ORDER — CHLORHEXIDINE GLUCONATE 213 G/1000ML
15 SOLUTION TOPICAL EVERY 12 HOURS
Refills: 0 | Status: DISCONTINUED | OUTPATIENT
Start: 2023-01-01 | End: 2023-05-27

## 2023-01-01 RX ORDER — MAGNESIUM SULFATE 500 MG/ML
1 VIAL (ML) INJECTION ONCE
Refills: 0 | Status: COMPLETED | OUTPATIENT
Start: 2023-01-01 | End: 2023-01-01

## 2023-01-01 RX ORDER — LEVETIRACETAM 250 MG/1
500 TABLET, FILM COATED ORAL EVERY 12 HOURS
Refills: 0 | Status: DISCONTINUED | OUTPATIENT
Start: 2023-01-01 | End: 2023-01-01

## 2023-01-01 RX ORDER — PROPOFOL 10 MG/ML
10 INJECTION, EMULSION INTRAVENOUS
Qty: 1000 | Refills: 0 | Status: DISCONTINUED | OUTPATIENT
Start: 2023-01-01 | End: 2023-01-01

## 2023-01-01 RX ORDER — SODIUM BICARBONATE 1 MEQ/ML
25 SYRINGE (ML) INTRAVENOUS ONCE
Refills: 0 | Status: COMPLETED | OUTPATIENT
Start: 2023-01-01 | End: 2023-01-01

## 2023-01-01 RX ORDER — HYDRALAZINE HCL 50 MG
10 TABLET ORAL
Refills: 0 | Status: DISCONTINUED | OUTPATIENT
Start: 2023-01-01 | End: 2023-01-01

## 2023-01-01 RX ORDER — SODIUM CHLORIDE 9 MG/ML
1000 INJECTION INTRAMUSCULAR; INTRAVENOUS; SUBCUTANEOUS ONCE
Refills: 0 | Status: COMPLETED | OUTPATIENT
Start: 2023-01-01 | End: 2023-01-01

## 2023-01-01 RX ORDER — CHLORHEXIDINE GLUCONATE 213 G/1000ML
1 SOLUTION TOPICAL DAILY
Refills: 0 | Status: DISCONTINUED | OUTPATIENT
Start: 2023-01-01 | End: 2023-01-01

## 2023-01-01 RX ORDER — DEXTROSE 50 % IN WATER 50 %
25 SYRINGE (ML) INTRAVENOUS ONCE
Refills: 0 | Status: DISCONTINUED | OUTPATIENT
Start: 2023-01-01 | End: 2023-01-01

## 2023-01-01 RX ORDER — NICARDIPINE HYDROCHLORIDE 30 MG/1
5 CAPSULE, EXTENDED RELEASE ORAL
Qty: 40 | Refills: 0 | Status: DISCONTINUED | OUTPATIENT
Start: 2023-01-01 | End: 2023-01-01

## 2023-01-01 RX ORDER — INSULIN LISPRO 100/ML
VIAL (ML) SUBCUTANEOUS EVERY 6 HOURS
Refills: 0 | Status: DISCONTINUED | OUTPATIENT
Start: 2023-01-01 | End: 2023-01-01

## 2023-01-01 RX ORDER — SODIUM CHLORIDE 9 MG/ML
1000 INJECTION, SOLUTION INTRAVENOUS ONCE
Refills: 0 | Status: COMPLETED | OUTPATIENT
Start: 2023-01-01 | End: 2023-01-01

## 2023-01-01 RX ORDER — ONDANSETRON 8 MG/1
4 TABLET, FILM COATED ORAL EVERY 8 HOURS
Refills: 0 | Status: DISCONTINUED | OUTPATIENT
Start: 2023-01-01 | End: 2023-01-01

## 2023-01-01 RX ORDER — SODIUM CHLORIDE 9 MG/ML
1000 INJECTION INTRAMUSCULAR; INTRAVENOUS; SUBCUTANEOUS
Refills: 0 | Status: DISCONTINUED | OUTPATIENT
Start: 2023-01-01 | End: 2023-01-01

## 2023-01-01 RX ORDER — ACETAMINOPHEN 500 MG
1000 TABLET ORAL ONCE
Refills: 0 | Status: COMPLETED | OUTPATIENT
Start: 2023-01-01 | End: 2023-01-01

## 2023-01-01 RX ORDER — ONDANSETRON 8 MG/1
1 TABLET, FILM COATED ORAL
Qty: 1 | Refills: 0
Start: 2023-01-01

## 2023-01-01 RX ORDER — POTASSIUM PHOSPHATE, MONOBASIC POTASSIUM PHOSPHATE, DIBASIC 236; 224 MG/ML; MG/ML
15 INJECTION, SOLUTION INTRAVENOUS ONCE
Refills: 0 | Status: COMPLETED | OUTPATIENT
Start: 2023-01-01 | End: 2023-01-01

## 2023-01-01 RX ORDER — ONDANSETRON 8 MG/1
4 TABLET, FILM COATED ORAL ONCE
Refills: 0 | Status: COMPLETED | OUTPATIENT
Start: 2023-01-01 | End: 2023-01-01

## 2023-01-01 RX ORDER — SUCCINYLCHOLINE CHLORIDE 100 MG/5ML
100 SYRINGE (ML) INTRAVENOUS ONCE
Refills: 0 | Status: COMPLETED | OUTPATIENT
Start: 2023-01-01 | End: 2023-01-01

## 2023-01-01 RX ORDER — CHLORHEXIDINE GLUCONATE 213 G/1000ML
15 SOLUTION TOPICAL EVERY 12 HOURS
Refills: 0 | Status: DISCONTINUED | OUTPATIENT
Start: 2023-01-01 | End: 2023-01-01

## 2023-01-01 RX ORDER — POTASSIUM CHLORIDE 20 MEQ
40 PACKET (EA) ORAL EVERY 4 HOURS
Refills: 0 | Status: COMPLETED | OUTPATIENT
Start: 2023-01-01 | End: 2023-01-01

## 2023-01-01 RX ORDER — LABETALOL HCL 100 MG
10 TABLET ORAL
Refills: 0 | Status: DISCONTINUED | OUTPATIENT
Start: 2023-01-01 | End: 2023-01-01

## 2023-01-01 RX ORDER — MORPHINE SULFATE 50 MG/1
1 CAPSULE, EXTENDED RELEASE ORAL
Refills: 0 | Status: DISCONTINUED | OUTPATIENT
Start: 2023-01-01 | End: 2023-01-01

## 2023-01-01 RX ORDER — POTASSIUM CHLORIDE 20 MEQ
10 PACKET (EA) ORAL
Refills: 0 | Status: COMPLETED | OUTPATIENT
Start: 2023-01-01 | End: 2023-01-01

## 2023-01-01 RX ORDER — ETOMIDATE 2 MG/ML
20 INJECTION INTRAVENOUS ONCE
Refills: 0 | Status: COMPLETED | OUTPATIENT
Start: 2023-01-01 | End: 2023-01-01

## 2023-01-01 RX ORDER — PROPOFOL 10 MG/ML
5 INJECTION, EMULSION INTRAVENOUS
Qty: 1000 | Refills: 0 | Status: DISCONTINUED | OUTPATIENT
Start: 2023-01-01 | End: 2023-01-01

## 2023-01-01 RX ORDER — SODIUM CHLORIDE 9 MG/ML
1000 INJECTION INTRAMUSCULAR; INTRAVENOUS; SUBCUTANEOUS ONCE
Refills: 0 | Status: DISCONTINUED | OUTPATIENT
Start: 2023-01-01 | End: 2023-01-01

## 2023-01-01 RX ORDER — PANTOPRAZOLE SODIUM 20 MG/1
40 TABLET, DELAYED RELEASE ORAL DAILY
Refills: 0 | Status: DISCONTINUED | OUTPATIENT
Start: 2023-01-01 | End: 2023-01-01

## 2023-01-01 RX ORDER — DEXTROSE 50 % IN WATER 50 %
12.5 SYRINGE (ML) INTRAVENOUS ONCE
Refills: 0 | Status: DISCONTINUED | OUTPATIENT
Start: 2023-01-01 | End: 2023-01-01

## 2023-01-01 RX ORDER — PHENYLEPHRINE HYDROCHLORIDE 10 MG/ML
0.1 INJECTION INTRAVENOUS
Qty: 40 | Refills: 0 | Status: DISCONTINUED | OUTPATIENT
Start: 2023-01-01 | End: 2023-01-01

## 2023-01-01 RX ORDER — NICARDIPINE HYDROCHLORIDE 30 MG/1
5 CAPSULE, EXTENDED RELEASE ORAL
Qty: 40 | Refills: 0 | Status: DISCONTINUED | OUTPATIENT
Start: 2023-01-01 | End: 2023-05-27

## 2023-01-01 RX ORDER — NOREPINEPHRINE BITARTRATE/D5W 8 MG/250ML
0.05 PLASTIC BAG, INJECTION (ML) INTRAVENOUS
Qty: 8 | Refills: 0 | Status: DISCONTINUED | OUTPATIENT
Start: 2023-01-01 | End: 2023-01-01

## 2023-01-01 RX ORDER — METOPROLOL TARTRATE 50 MG
5 TABLET ORAL ONCE
Refills: 0 | Status: COMPLETED | OUTPATIENT
Start: 2023-01-01 | End: 2023-01-01

## 2023-01-01 RX ORDER — ROBINUL 0.2 MG/ML
0.2 INJECTION INTRAMUSCULAR; INTRAVENOUS EVERY 6 HOURS
Refills: 0 | Status: DISCONTINUED | OUTPATIENT
Start: 2023-01-01 | End: 2023-01-01

## 2023-01-01 RX ORDER — POTASSIUM CHLORIDE 20 MEQ
10 PACKET (EA) ORAL
Refills: 0 | Status: DISCONTINUED | OUTPATIENT
Start: 2023-01-01 | End: 2023-01-01

## 2023-01-01 RX ORDER — PROPOFOL 10 MG/ML
5 INJECTION, EMULSION INTRAVENOUS
Qty: 1000 | Refills: 0 | Status: DISCONTINUED | OUTPATIENT
Start: 2023-01-01 | End: 2023-05-27

## 2023-01-01 RX ADMIN — LEVETIRACETAM 400 MILLIGRAM(S): 250 TABLET, FILM COATED ORAL at 06:14

## 2023-01-01 RX ADMIN — SODIUM CHLORIDE 1000 MILLILITER(S): 9 INJECTION, SOLUTION INTRAVENOUS at 20:08

## 2023-01-01 RX ADMIN — Medication 100 MILLIGRAM(S): at 07:44

## 2023-01-01 RX ADMIN — Medication 400 MILLIGRAM(S): at 08:00

## 2023-01-01 RX ADMIN — Medication 100 MILLIEQUIVALENT(S): at 16:08

## 2023-01-01 RX ADMIN — ROBINUL 0.2 MILLIGRAM(S): 0.2 INJECTION INTRAMUSCULAR; INTRAVENOUS at 11:08

## 2023-01-01 RX ADMIN — SODIUM CHLORIDE 1000 MILLILITER(S): 9 INJECTION INTRAMUSCULAR; INTRAVENOUS; SUBCUTANEOUS at 01:34

## 2023-01-01 RX ADMIN — LEVETIRACETAM 400 MILLIGRAM(S): 250 TABLET, FILM COATED ORAL at 17:25

## 2023-01-01 RX ADMIN — SODIUM CHLORIDE 75 MILLILITER(S): 9 INJECTION INTRAMUSCULAR; INTRAVENOUS; SUBCUTANEOUS at 12:30

## 2023-01-01 RX ADMIN — Medication 10 MILLIGRAM(S): at 21:07

## 2023-01-01 RX ADMIN — Medication 100 MILLIEQUIVALENT(S): at 13:19

## 2023-01-01 RX ADMIN — Medication 100 MILLIEQUIVALENT(S): at 06:47

## 2023-01-01 RX ADMIN — POTASSIUM PHOSPHATE, MONOBASIC POTASSIUM PHOSPHATE, DIBASIC 62.5 MILLIMOLE(S): 236; 224 INJECTION, SOLUTION INTRAVENOUS at 08:31

## 2023-01-01 RX ADMIN — PROPOFOL 2.07 MICROGRAM(S)/KG/MIN: 10 INJECTION, EMULSION INTRAVENOUS at 08:00

## 2023-01-01 RX ADMIN — Medication 10 MILLIGRAM(S): at 17:39

## 2023-01-01 RX ADMIN — NICARDIPINE HYDROCHLORIDE 25 MG/HR: 30 CAPSULE, EXTENDED RELEASE ORAL at 09:39

## 2023-01-01 RX ADMIN — SODIUM CHLORIDE 2000 MILLILITER(S): 9 INJECTION INTRAMUSCULAR; INTRAVENOUS; SUBCUTANEOUS at 16:18

## 2023-01-01 RX ADMIN — NICARDIPINE HYDROCHLORIDE 25 MG/HR: 30 CAPSULE, EXTENDED RELEASE ORAL at 08:00

## 2023-01-01 RX ADMIN — PROPOFOL 1.8 MICROGRAM(S)/KG/MIN: 10 INJECTION, EMULSION INTRAVENOUS at 09:39

## 2023-01-01 RX ADMIN — Medication 6.06 MICROGRAM(S)/KG/MIN: at 08:10

## 2023-01-01 RX ADMIN — Medication 40 MILLIEQUIVALENT(S): at 17:32

## 2023-01-01 RX ADMIN — CHLORHEXIDINE GLUCONATE 15 MILLILITER(S): 213 SOLUTION TOPICAL at 21:08

## 2023-01-01 RX ADMIN — Medication 100 GRAM(S): at 06:47

## 2023-01-01 RX ADMIN — ETOMIDATE 20 MILLIGRAM(S): 2 INJECTION INTRAVENOUS at 07:43

## 2023-01-01 RX ADMIN — Medication 10 MILLIGRAM(S): at 15:30

## 2023-01-01 RX ADMIN — Medication 100 MILLIEQUIVALENT(S): at 06:13

## 2023-01-01 RX ADMIN — ONDANSETRON 4 MILLIGRAM(S): 8 TABLET, FILM COATED ORAL at 16:18

## 2023-01-01 RX ADMIN — SODIUM CHLORIDE 1000 MILLILITER(S): 9 INJECTION, SOLUTION INTRAVENOUS at 14:47

## 2023-01-01 RX ADMIN — Medication 2: at 17:29

## 2023-01-01 RX ADMIN — Medication 100 MILLIEQUIVALENT(S): at 14:42

## 2023-01-01 RX ADMIN — Medication 25 MILLIEQUIVALENT(S): at 08:31

## 2023-01-01 RX ADMIN — Medication 5 MILLIGRAM(S): at 01:33

## 2023-01-01 RX ADMIN — PHENYLEPHRINE HYDROCHLORIDE 2.42 MICROGRAM(S)/KG/MIN: 10 INJECTION INTRAVENOUS at 08:00

## 2023-01-01 RX ADMIN — CHLORHEXIDINE GLUCONATE 15 MILLILITER(S): 213 SOLUTION TOPICAL at 06:13

## 2023-01-01 RX ADMIN — PANTOPRAZOLE SODIUM 40 MILLIGRAM(S): 20 TABLET, DELAYED RELEASE ORAL at 13:19

## 2023-01-01 RX ADMIN — Medication 40 MILLIEQUIVALENT(S): at 15:45

## 2023-01-01 RX ADMIN — Medication 10 MILLIGRAM(S): at 20:05

## 2023-04-18 PROBLEM — F41.9 ANXIETY DISORDER, UNSPECIFIED: Chronic | Status: ACTIVE | Noted: 2020-08-13

## 2023-04-18 PROBLEM — F03.90 UNSPECIFIED DEMENTIA WITHOUT BEHAVIORAL DISTURBANCE: Chronic | Status: ACTIVE | Noted: 2020-08-13

## 2023-04-18 NOTE — ED PROVIDER NOTE - PATIENT PORTAL LINK FT
You can access the FollowMyHealth Patient Portal offered by [facility] by registering at the following website: [email or web address]. By joining [facility]’s FollowMyHealth portal, you will also be able to view your health information using other applications (apps) compatible with our system.

## 2023-04-18 NOTE — ED PROVIDER NOTE - CARE PROVIDER_API CALL
Justin Galan)  Internal Medicine  [address] [place] [address]  Phone: [phone]  Fax: [phone]  Follow Up Time: 1-3 Days

## 2023-04-18 NOTE — ED PROVIDER NOTE - CLINICAL SUMMARY MEDICAL DECISION MAKING FREE TEXT BOX
72-year-old female with history of dementia, presents to the ER with son, patient states that she is not feeling well today having dizziness, nausea vomiting, generalized weakness, patient awake and alert, is in difficulty speaking as per her aphasia and dementia which is at the baseline as per her son, heart lungs are clear abdomen is soft, we will follow-up CBC, CMP, UA, CT head, IV fluids, antiemetics and reevaluate patient

## 2023-04-18 NOTE — ED PROVIDER NOTE - NS ED ATTENDING STATEMENT MOD
This was a shared visit with the OMAIRA. I reviewed and verified the documentation and independently performed the documented:

## 2023-04-18 NOTE — ED ADULT NURSE NOTE - OBJECTIVE STATEMENT
received pt c/o n/v/d and dizziness today.   Abd soft, nontender to palpation though pt does reports occasional R lower abd pain "for a long time".   Pt at baseline mental status.   Respiratoins even and unlabored.  Pt sinus rafia to NSR hr 46-65 bpm.   per son pt lives independentyl at home and manages her own medications, though with new dx of dementia is it unclear of pt med admin routine.   Skin warm and dry, no s/s of trauma.  Safety maintained.  Denies cp/sob/palpitations. BN received pt c/o n/v/d and dizziness today.   Abd soft, nontender to palpation though pt does reports occasional R lower abd pain "for a long time".   Pt at baseline mental status.   Respiratoins even and unlabored.  Pt sinus rafia to NSR hr 46-65 bpm.   per son pt lives independentyl at home and manages her own medications, though with new dx of dementia is it unclear of pt med admin routine.   Skin warm and dry, no s/s of trauma.  Safety maintained.  Denies cp/sob/palpitations. BN  1832:  Pt assisted oob ambulatory with steady gait.  Pt tolerating PO dinner at this time.   Appears in no acute distress. Denies dizziness/cp/sob/palpitations. BN

## 2023-04-18 NOTE — ED ADULT TRIAGE NOTE - CHIEF COMPLAINT QUOTE
Nausea, vomiting, diarrhea and dizziness after taking heart medication. Pt dx with Alzheimer x 1 year

## 2023-04-18 NOTE — ED ADULT NURSE NOTE - INTERVENTIONS DEFINITIONS
[place] to call system/Call bell, personal items and telephone within reach/Instruct patient to call for assistance/Room bathroom lighting operational/Non-slip footwear when patient is off stretcher/Physically safe environment: no spills, clutter or unnecessary equipment/Stretcher in lowest position, wheels locked, appropriate side rails in place/Provide visual cue, wrist band, yellow gown, etc./Monitor gait and stability/Monitor for mental status changes and reorient to person, place, and time/Review medications for side effects contributing to fall risk/Reinforce activity limits and safety measures with patient and family/Provide visual clues: red socks

## 2023-04-18 NOTE — ED PROVIDER NOTE - CARE PLAN
Principal Discharge DX:	Dizziness   1 Principal Discharge DX:	Dizziness  Secondary Diagnosis:	Nausea vomiting and diarrhea

## 2023-04-18 NOTE — ED PROVIDER NOTE - PROGRESS NOTE DETAILS
Reevaluated patient at bedside.  Patient feeling much improved.  Discussed the results of all diagnostic testing in ED and copies of all available reports given.   An opportunity to ask questions was provided.  Discussed the importance of prompt, close medical follow-up.  Patient will return with any changes, concerns or persistent/worsening symptoms.  Understanding of all instructions verbalized. Patient eating food and ambulating in ED without difficulty. Plan discussed with patient and son, both feel comfortable with patient being discharge. Patient and son understand strict return precautions.

## 2023-04-18 NOTE — ED PROVIDER NOTE - OBJECTIVE STATEMENT
72-year-old female with history of dementia, hypertension, hyperlipidemia presents to the ED with her son complaining of nausea, vomiting, diarrhea and dizziness since this morning.  Son explains that patient called him stating that she is not feeling well and having multiple episodes of diarrhea. Patient explains that she took her normal medications this morning.  No recent changes in her medication however her son explains that she had not taken her donepezil for a few weeks because she had run out, and restarted taking it this morning.  Son states otherwise patient is acting herself.  No known fall or injury.  Denies fever, chills, chest pain, shortness of breath, abdominal pain, urinary symptoms, flank pain.    PMD: Dr. Justin Galan

## 2023-05-24 NOTE — ED ADULT NURSE NOTE - PAIN: PRESENCE, MLM
Chart reviewed. Medicare important message  letter was delivered to daughter yesterday. Daughter planned to appeal the discharge. CM followed up with daughter this morning, daughter stated that she was unable was reach anyone when she attempted to call for the appeal yesterday, and she plans to call today. Hospitalist is aware. CM also inquired about home health choice, Daughter prefers [facility]. CM sent referral via [employer]. . Patient will need BLS transport at discharge. CM will continue to follow.     BLAYNE Garcia/ROYA non-verbal indicators absent (Rating = 0)

## 2023-05-24 NOTE — GOALS OF CARE CONVERSATION - ADVANCED CARE PLANNING - CONVERSATION DETAILS
I discussed at length the patient's diagnosis and likely poor prognosis. The repeat CTH showed expansion of the ICH. We discussed the possibility of her heart stopping and her children decided to make her code status DNR.

## 2023-05-24 NOTE — ED ADULT NURSE NOTE - OBJECTIVE STATEMENT
0706: 72 yr female BIBEMS. per EMS, pt found laying on front lawn (waiting for ride) with left sided paralysis, slurred speech and wide non-resposive pupils. stroke code activated. pt slurred speech, moving right extremities at will, left side parlysis, 0706: 72 yr female BIBEMS. per EMS, pt found laying on front lawn (waiting for ride) with left sided paralysis, slurred speech and wide non-resposive pupils. stroke code activated. pt slurred speech, moving right extremities at will, left side paralysis, nonresponsive pupils, unable to access further due to pt condition/response. stat CT performed. rapid sequence intubation performed by RT.

## 2023-05-24 NOTE — ED ADULT TRIAGE NOTE - CHIEF COMPLAINT QUOTE
per ems from home, per ems pt found on the front lawn, was supposed to be getting ready for work to be picked up. per ems pt has left sided paralysis, slurred speech and a blown pupil

## 2023-05-24 NOTE — H&P ADULT - NSHPSOCIALHISTORY_GEN_ALL_CORE
Was living independently, did not drive, daily worker at [employer], walked without assistance   No alcohol, drug, or tobacco use

## 2023-05-24 NOTE — DISCHARGE NOTE NURSING/CASE MANAGEMENT/SOCIAL WORK - NSDCPEFALRISK_GEN_ALL_CORE
For information on Fall & Injury Prevention, visit: [email or web address] OR  [email or web address] OR  https://www.cdc.gov/steadi/patient.html

## 2023-05-24 NOTE — H&P ADULT - HISTORY OF PRESENT ILLNESS
72 year old female found down on her front lawn today by her boss who was coming to pick her up for work. Last known well was last night. Pt presented to [facility] unresponsive and was found to have large right ICH, intubated and transferred to [facility] for neurosurgical consultation. ICH 3, NIH 33, mrs 0

## 2023-05-24 NOTE — ED PROVIDER NOTE - OBJECTIVE STATEMENT
pt BIBEMS, found down on her lawn this morning, EMS reports no L sided movement, gagging and blown R pupil, minimal history available

## 2023-05-24 NOTE — ED ADULT NURSE REASSESSMENT NOTE - NSFALLHARMRISKINTERV_ED_ALL_ED
Assistance OOB with selected safe patient handling equipment if applicable/Assistance with ambulation/Communicate risk of Fall with Harm to all staff, patient, and family/Encourage patient to sit up slowly, dangle for a short time, stand at bedside before walking/Orthostatic vital signs/Provide visual cue: red socks, yellow wristband, yellow gown, etc/Reinforce activity limits and safety measures with patient and family/Review medications for side effects contributing to fall risk/Toileting schedule using arm’s reach rule for commode and bathroom/Bed in lowest position, wheels locked, appropriate side rails in place/Call bell, personal items and telephone in reach/Instruct patient to call for assistance before getting out of bed/chair/stretcher/Non-slip footwear applied when patient is off stretcher/[place] to call system/Physically safe environment - no spills, clutter or unnecessary equipment/Purposeful Proactive Rounding/Room/bathroom lighting operational, light cord in reach

## 2023-05-24 NOTE — ED ADULT NURSE NOTE - BRADEN SCORE (IF 18 OR LESS ACTIVATE SKIN INJURY RISK INCREASED GUIDELINE), MLM
[FreeTextEntry8] : 40 yo F with DMII, carpel tunnel syndrome, and morbid obesity presents with complaints of lower back pain and b/l feet pain. \par \par #Back pain \par -The pain started 2 weeks ago. It is sharp, radiating to her groin, intermittent, and reproducible on palpation. The pain worsens with rest and lying down. It improves with tylenol and exercise. She denies any recent injury, heavy lifting, or hx of kdiney stones. She also denies any fever, chills, nausea, vomiting, bowel or urinary incontinence, or focal deficits. \par \par #B/l Feet pain \par -The pain is located at the arch of the feet. It is associated with swelling and mild bony growth. It is reproducible on palpation and worsens with rest. It improves with walking and standing.  16

## 2023-05-24 NOTE — ED ADULT NURSE REASSESSMENT NOTE - NSFALLFACTORS_ED_ALL_ED
Unresponsive/IV and/or equipment tethered to patient/Medication side effects/Orthostatic hypotension

## 2023-05-24 NOTE — CONSULT NOTE ADULT - SUBJECTIVE AND OBJECTIVE BOX
Patient is a 72y old  Female who presents with a chief complaint of ICH (24 May 2023 10:40)    HPI:  72 year old female found down on her front lawn today by her boss who was coming to pick her up for work. Last known well was last night. Pt presented to [facility] unresponsive and was found to have large right ICH, intubated and transferred to [facility] for neurosurgical consultation. ICH 3, NIH 33, mrs 0     (24 May 2023 10:40)        PAST MEDICAL & SURGICAL HISTORY:  CAD (coronary artery disease)      Dementia        FAMILY HISTORY:    Allergies    No Known Allergies    Intolerances        REVIEW OF SYSTEMS  unable to perform due to neurological status     HOME MEDICATIONS:  Home Medications:      MEDICATIONS:  Antibiotics:    Neuro:  levETIRAcetam  IVPB 500 milliGRAM(s) IV Intermittent every 12 hours  propofol Infusion 5 MICROgram(s)/kG/Min IV Continuous <Continuous>    Anticoagulation:    OTHER:  chlorhexidine 0.12% Liquid 15 milliLiter(s) Oral Mucosa every 12 hours  hydrALAZINE Injectable 10 milliGRAM(s) IV Push every 2 hours PRN  labetalol Injectable 10 milliGRAM(s) IV Push every 2 hours PRN  niCARdipine Infusion 5 mG/Hr IV Continuous <Continuous>  pantoprazole  Injectable 40 milliGRAM(s) IV Push daily    IVF:  sodium chloride 0.9%. 1000 milliLiter(s) IV Continuous <Continuous>      Vital Signs Last 24 Hrs  T(C): 33.8 (24 May 2023 09:50), Max: 33.9 (24 May 2023 09:30)  T(F): 92.8 (24 May 2023 09:50), Max: 93 (24 May 2023 09:30)  HR: 89 (24 May 2023 09:50) (83 - 99)  BP: 102/56 (24 May 2023 09:50) (102/56 - 132/69)  BP(mean): --  RR: 16 (24 May 2023 09:50) (16 - 18)  SpO2: 100% (24 May 2023 09:50) (100% - 100%)    Parameters below as of 24 May 2023 09:50  Patient On (Oxygen Delivery Method): ventilator          PHYSICAL EXAM:  Constitutional: intubated, off sedation   	Neuro  	* Mental Status:  GCS 4T: E1, V1T, M2. does not open eyes, does not follow commands .   	* Cranial Nerves: Right pupils fixed and dilated, left pupil 3mm reactive, R no corneal, L + corneal, + cough / gag.   	* Motor: LUE extends, RUE flex, b/l LE TF   	* Sensory: responds to noxious   	* Reflexes: Not assessed  	* Gait: Not assessed    	Cardiovascular: rrr  	Eyes: See neurologic examination with detailed examination of eyes.  	ENT: No JVD, Trachea Midline.  	Respiratory: intubated   	Gastrointestinal: Soft, nontender, nondistended.  	Genitourinary: Female  	Musculoskeletal: No muscle wasting noted, (See neuorlogic assessment for full muscle strength assessment) No pretibial edema appreciated, no appreciable calf tenderness.  	Skin:  no skin breakdown  	Musculoskeletal: See detailed muscle strength examination, listed under neurologic examination.  Hematologic / Lymph / Immunologic: No bleeding from IV sites or wounds, No lymphadenopathy, No HIves or allergic type skin lesions    LABS:                CULTURES:      RADIOLOGY & ADDITIONAL STUDIES:      CAPRINI SCORE [CLOT]:  Patient has an estimated Caprini score of greater than 5.  However, the patient's unique clinical situation will be addressed in an individual manner to determine appropriate anticoagulation treatment, if any.

## 2023-05-24 NOTE — CONSULT NOTE ADULT - ASSESSMENT
72F with large right ICH     Recommendations   - Per discussion with family no neurosurigical intervention   - Maximize medical management, however, prognosis remains poor - grave   - f/u DNR with family   - Recommend palliative care to assist   - NSG signing off please re-consult as needed

## 2023-05-24 NOTE — H&P ADULT - ASSESSMENT
72F with large right ICH     Plan   Neuro  - q1 neuro checks   - per discussion with family no neurosurigcal intervention   - pending repeat CT head, CTA head and neck   - Keppra 500 BID     Cards  - -160   - Cardene, PRN hydralazine / labetalol   - ekg / echo     Resp  - on vent, check abg, chest xray     GI  - NPO   - protonix       - [place]   - STAT BMP, then q8, DI watch     ID   - no acute issues     Heme  - SCDs only for dvt ppx   - Pt was on ASA 81, no reversal at this time     Endo  - no active issues     Dispo: NSICU, no surgery, family deciding on DNR

## 2023-05-24 NOTE — ED PROVIDER NOTE - OBJECTIVE STATEMENT
71 y/o female with PMHx of dementia, HTN, HLD presents to the ED as a transfer from [place] for ICH. Pt was found outside on her lawn unresponsive with blown pupil. Pt arrives to the ED intubated, unable to obtain further hx. CT reports from [place]: "There is a large multiloculated, ill-defined intracranial hemorrhage centered within the right occipital parietal region with extension into the right frontal and right temporal lobes, that measures approximately 11 x 6.1 x 5.8 cm with adjacent vasogenic edema and mass effect. There is resultant right to left midline shift of approximately 1.6 cm demonstrating effacement of the right lateral ventricle and mild dilatation of the left occipital horn. In addition, there is a right frontotemporal subdural hemorrhage which measures up to 8 mm. There is effacement of the basilar cisterns suggesting right uncal herniation. Small subdural hemorrhage along the right tentorial leaf extending superiorly along the right falx and right parietal skull. Skull is intact."    MRN: [id number]

## 2023-05-24 NOTE — ED ADULT NURSE NOTE - NSFALLRISKFACTORS_ED_ALL_ED
stroke code/Coagulation: Bleeding disorder either through use of anticoagulants or underlying clinical condition(s)/Other

## 2023-05-24 NOTE — ED PROVIDER NOTE - NS_ ATTENDINGSCRIBEDETAILS _ED_A_ED_FT
I, Maria Antonia Bermudez, performed the initial face to face bedside interview with this patient regarding history of present illness, review of symptoms and relevant past medical, social and family history.  I completed an independent physical examination.  The history, relevant review of systems, past medical and surgical history, medical decision making, and physical examination was documented by the scribe in my presence and I attest to the accuracy of the documentation.

## 2023-05-24 NOTE — ED ADULT NURSE NOTE - NSFALLHARMRISKINTERV_ED_ALL_ED
Assistance OOB with selected safe patient handling equipment if applicable/Communicate risk of Fall with Harm to all staff, patient, and family/Provide patient with walking aids/Provide visual cue: red socks, yellow wristband, yellow gown, etc/Reinforce activity limits and safety measures with patient and family/Bed in lowest position, wheels locked, appropriate side rails in place/Call bell, personal items and telephone in reach/Instruct patient to call for assistance before getting out of bed/chair/stretcher/Non-slip footwear applied when patient is off stretcher/[place] to call system/Physically safe environment - no spills, clutter or unnecessary equipment/Purposeful Proactive Rounding/Room/bathroom lighting operational, light cord in reach

## 2023-05-24 NOTE — ED ADULT NURSE REASSESSMENT NOTE - NS ED NURSE REASSESS COMMENT FT1
Pt s/p CT scan and as per MD's orders pt prepped for intubation. Pt given Etomidate and Succ and tolerated well. Pt intubated at 0745. Pt intubated with a 7.5 tube and 23 at lip line. Lung auscultated for breath sounds, and breath sounds noted. Vent setting CVM Rate 22, 450, 40% and 5 peep. Pt now being prepped for transfer to [place] ED for further care.  Continuum of care with RN Danie Sorto.

## 2023-05-24 NOTE — H&P ADULT - CRITICAL CARE ATTENDING COMMENT
Pt seen and examined. Agree with above assessment and plan.     72F with large right ICH with mass effect    Neuro  * Mental Status:  GCS 4T: E1, V1T, M2. does not open eyes, does not follow commands .   * Cranial Nerves: Right pupils fixed and dilated, left pupil 3mm reactive, R no corneal, L + corneal, + cough / gag.   * Motor: LUE extends, RUE flex, b/l LE TF     Neurochecks q1h  surgery is not in line with family goals  repeat CTH in 6h  SBP<160

## 2023-05-24 NOTE — CONSULT NOTE ADULT - NS ATTEND AMEND GEN_ALL_CORE FT
QING Attg:    see above    patient seen and examined    GCS 4T  E1 V1T M4  right pupil 9 mm NR  left pupil 2 mm NR  no corneal  + gag  extends to deep noxious    I explained the imaging findings and clinical exam to the patient's son and daughter.  I explained the anticipated poor prognosis for meaningful functional recovery with surgical intervention.  I explained the anticipate progression to brain death without surgery despite maximal medical therapy.  The family verbalizes their understanding.  I have answered all their questions.    No neurosurgical intervention per d/w family  supportive care per ICU  consider DNR -- d/w family  palliative care consult at discretion of ICU team

## 2023-05-24 NOTE — ED ADULT NURSE NOTE - NSFALLUNIVINTERV_ED_ALL_ED
Bed/Stretcher in lowest position, wheels locked, appropriate side rails in place/Call bell, personal items and telephone in reach/Instruct patient to call for assistance before getting out of bed/chair/stretcher/Non-slip footwear applied when patient is off stretcher/[place] to call system/Physically safe environment - no spills, clutter or unnecessary equipment/Purposeful proactive rounding/Room/bathroom lighting operational, light cord in reach

## 2023-05-24 NOTE — DISCHARGE NOTE NURSING/CASE MANAGEMENT/SOCIAL WORK - PATIENT PORTAL LINK FT
You can access the FollowMyHealth Patient Portal offered by [facility] by registering at the following website: [email or web address]. By joining [employer]’s FollowMyHealth portal, you will also be able to view your health information using other applications (apps) compatible with our system.

## 2023-05-24 NOTE — INITIAL ORGAN DONATION REFERRAL - NSORGANDONATIONCLINICALTRIGGER_GEN_ALL_CORE
[place] Coma Scale is less than or equal to 5/Family discussion withdrawal of life-sustaining therapies is anticipated

## 2023-05-24 NOTE — PATIENT PROFILE ADULT - FALL HARM RISK - HARM RISK INTERVENTIONS
Assistance with ambulation/Assistance OOB with selected safe patient handling equipment/Communicate Risk of Fall with Harm to all staff/Discuss with provider need for PT consult/Monitor gait and stability/Reinforce activity limits and safety measures with patient and family/Tailored Fall Risk Interventions/Visual Cue: Yellow wristband and red socks/Bed in lowest position, wheels locked, appropriate side rails in place/Call bell, personal items and telephone in reach/Instruct patient to call for assistance before getting out of bed or chair/Non-slip footwear when patient is out of bed/[place] to call system/Physically safe environment - no spills, clutter or unnecessary equipment/Purposeful Proactive Rounding/Room/bathroom lighting operational, light cord in reach

## 2023-05-24 NOTE — ED ADULT NURSE NOTE - OBJECTIVE STATEMENT
pt transferred from [place] intubated s/p found on front lawn unresponsive. son en route to hospital, neuro at bedside for eval.

## 2023-05-24 NOTE — ED PROVIDER NOTE - CLINICAL SUMMARY MEDICAL DECISION MAKING FREE TEXT BOX
Pt with lare ICH with signs of herniation. clinically not responding though is sedated on propofol. poor prognosis. family aware. NSx at bedside Dr. Buenrostro requesting further imaging and admit to NeuroICU

## 2023-05-24 NOTE — ED PROVIDER NOTE - PHYSICAL EXAMINATION
Gen: unresponsive   Head: NCAT  HEENT: right pupil blow  Lung: intubated  CV: regular rate  Abd: soft, ND,   MSK: No edema, no visible deformities  Neuro: unresponsive Gen: unresponsive   Head: NCAT  HEENT: right pupil blow, left unreactive +3mm  Lung: intubated  CV: regular rate  Abd: soft, ND,   MSK: No edema, no visible deformities  Neuro: unresponsive, does not respond to pain in any extremity

## 2023-05-24 NOTE — ED PROVIDER NOTE - CARE PLAN
1 Principal Discharge DX:	ICH (intracerebral hemorrhage)  Secondary Diagnosis:	SDH (subdural hematoma)  Secondary Diagnosis:	Uncal herniation

## 2023-05-24 NOTE — H&P ADULT - NSHPPHYSICALEXAM_GEN_ALL_CORE
Physical Exam:     Constitutional: intubated, off sedation   	Neuro  	* Mental Status:  GCS 4T: E1, V1T, M2. does not open eyes, does not follow commands .   	* Cranial Nerves: Right pupils fixed and dilated, left pupil 3mm reactive, R no corneal, L + corneal, + cough / gag.   	* Motor: LUE extends, RUE flex, b/l LE TF   	* Sensory: responds to noxious   	* Reflexes: Not assessed  	* Gait: Not assessed    	Cardiovascular: rrr  	Eyes: See neurologic examination with detailed examination of eyes.  	ENT: No JVD, Trachea Midline.  	Respiratory: intubated   	Gastrointestinal: Soft, nontender, nondistended.  	Genitourinary: Female  	Musculoskeletal: No muscle wasting noted, (See neuorlogic assessment for full muscle strength assessment) No pretibial edema appreciated, no appreciable calf tenderness.  	Skin:  no skin breakdown  	Musculoskeletal: See detailed muscle strength examination, listed under neurologic examination.  Hematologic / Lymph / Immunologic: No bleeding from IV sites or wounds, No lymphadenopathy, No HIves or allergic type skin lesions

## 2023-05-24 NOTE — ED PROVIDER NOTE - PHYSICAL EXAMINATION
Gen: unresponsive  HEENT:  blown R pupil, unresponsive  CV:  well perfused, irregular  Pulm:  agonal breathing  Abd: s/nt/nd  MSK: moving R side spontaneously  Neuro:  L side paralysis  Skin:  visualized areas intact

## 2023-05-24 NOTE — PATIENT PROFILE ADULT - FLU SEASON?
Occupational Therapy    Visit Type: treatment  Precautions:  Medical precautions:  fall risk;.   Lines:     Basic: capped IV  Safety Measures: bed alarm  SUBJECTIVE  Patient agreed to participate in therapy this date.  Patient in bed and notes she is having intense pain in left LE today but wants to get up to use toilet. Notes she is to discharge to rehab today     OBJECTIVE          Bed Mobility  - Supine to sit: minimal assist  Transfers  Assistive devices: 2-wheeled walker, gait belt  - Sit to stand: contact guard/touching/steadying assist  - Stand to sit: contact guard/touching/steadying assist      Activities of Daily Living (ADLs)  Grooming/Oral Hygiene:   - Grooming assist: contact guard/touching/steadying assist  - Position: standing at sink  - Assist needed for: brushing hair and wash/dry hands  Toileting:   - Toilet transfer:        - Assist: contact guard/touching/steadying assist       - Device: gait belt and 2-wheeled walker       - Equipment: grab bar use  - Assist: contact guard/touching/steadying assist  - Assist needed for: perineal hygiene  - Equipment: grab bar use             ASSESSMENT  Patient continues to be limited by pain. Patient is steady with balance in sitting and standing but is uncomfortable due to pain throughout session. Has less pain body language while standing and ambulating versus laying in bed and sitting but patient notes pain is constant throughout despite positioning. Patient was able to complete transfers despite pain for sit to stand CGA and only min A to sit up in bed with patient using BUE to pull on writer to sit up trunk. Able to move own BLE to EOB. Patient denied wanting to complete other ADLs today due to her pain. RN in room to administer medication at end of session.  [facility]    Progress: progressing toward goals    • Skilled therapy is required to address these limitations in attempt to maximize the patient's independence.    Education Provided:   Learning  Assessment:  - Primary learner: patient  - Are they ready to learn: yes  - Preferred learning style: verbal  - Barriers to learning: no barriers apparent at this time  Education provided during session:  - Receiving Education: patient  - Results of above outlined education: Verbalizes understanding  Pain at End of Session: RN informed on pain level   Pain: 9/10, location: LLE    Patient at End of Session:   Location: in chair  Safety measures: alarm system in place/re-engaged, call light within reach and lines intact  Handoff to: nurse    PLAN  Suggestions for next session as indicated: OT Frequency: 3-5 x per week  Frequency Comments: arpan DICKEY 10/3:  transfers. LB ADLs          GOALS  Long Term Goals: (to be met by time of discharge from hospital)  Lower body dressing: Patient will complete lower body dressing in sitting stand by assist.  Status: progressing/ongoing  Toileting: Patient will complete toileting stand by assist.  Status: progressing/ongoing  Bathing: Patient will complete bathing sitting at sinkminimal assist   Status: progressing/ongoing  Documented in the chart in the following areas: Pain. Assessment. Plan.      Therapy procedure time and total treatment time can be found documented on the Time Entry flowsheet   No

## 2023-05-25 NOTE — DISCHARGE NOTE FOR THE EXPIRED PATIENT - HOSPITAL COURSE
72 year old female found down on her front lawn by her boss who was coming to pick her up for work. Last known well was night prior. Pt presented to [facility] unresponsive and was found to have large right ICH, intubated and transferred to [facility] for neurosurgical consultation. ICH 3, NIH 33, mrs 0. On arrival to [facility], patient had worsening exam. Family discussions with neurosurgery and family, no surgical intervention offered. Repeat CTH showed significantly worsening hemorrhage with herniation. Patient became acutely hypotensive and tachycardic this morning, required multiple phenylephrine pushes and multiple pressors. Son was at bedside, called sister to present to hospital. Son and daughter opted for palliative extubation. Patient was extubated and pressors were removed, and patient passed, pronounced at 11:31 am. Son and daughter at bedside and notified. Emotional support provided.

## 2023-05-25 NOTE — PROGRESS NOTE ADULT - ASSESSMENT
72F with large right ICH     Plan   Neuro  - q1 neuro checks   - per discussion with family no neurosurigcal intervention   - pending repeat CT head, CTA head and neck   - Keppra 500 BID     Cards  - -160   - Cardene, PRN hydralazine / labetalol   - ekg / echo     Resp  - on vent, check abg, chest xray     GI  - NPO   - protonix       - Corrales   - STAT BMP, then q8, DI watch     ID   - no acute issues     Heme  - SCDs only for dvt ppx   - Pt was on ASA 81, no reversal at this time     Endo  - no active issues     Dispo: NSICU, no surgery, family deciding on DNR    72F with large right ICH     Plan   Neuro  - m3jkyac checks   - per discussion with family no neurosurgical intervention   - Keppra 500 BID   - GOC discussion- DNR, devastating ICH with brain herniation    Cards  - -160   - acute hypotension requiring dori gtt then levo gtt  - ekg / echo     Resp  - on vent  - no spont breaths    GI  - NPO   - protonix       - Corrales   - DI watch    ID   - no acute issues   - febrile, tylenol prn    Heme  - SCDs only for dvt ppx   - no chemo ppx due to ICH    Endo  - no active issues     Dispo: NSICU, no surgery  DNR

## 2023-05-25 NOTE — PROGRESS NOTE ADULT - CRITICAL CARE ATTENDING COMMENT
I spent 60 minutes of critical care time examining patient, reviewing vitals, labs, medications, imaging and discussing with the team goals of care to prevent life-threatening in this patient who is at high risk for deterioration or death due to:  ICH

## 2023-05-25 NOTE — CHART NOTE - NSCHARTNOTEFT_GEN_A_CORE
Called to bedside by nurse for hypotension and tachycardia. Patient was hypotensive 60s/40s and was tachycardic to the 150s. Patient was given 0.2 cc phenylephrine push with minor response, peripheral phenylephrine was started along with a fluid bolus. Patient continued to have minimal response to increase in pressor and multiple phenylephrine pushes. Norepinephrine was then initiated with improved response. Family at bedside during episode, son called sister to come to hospital given patient's instability. Discussed option of placing precious for better BP management, but son declined. Patient stabilized with /70s.
This patient requires IV contrast for a CT head and neck in the setting of large ICH. No family for consent, patient unable to consent. Medical team agrees benefits outweigh the risks

## 2023-06-05 RX ORDER — ROSUVASTATIN CALCIUM 5 MG/1
1 TABLET ORAL
Refills: 0 | DISCHARGE

## 2023-06-05 RX ORDER — DONEPEZIL HYDROCHLORIDE 10 MG/1
1 TABLET, FILM COATED ORAL
Refills: 0 | DISCHARGE

## 2023-06-05 RX ORDER — RAMIPRIL 5 MG
1 CAPSULE ORAL
Refills: 0 | DISCHARGE

## 2023-06-05 RX ORDER — METOPROLOL TARTRATE 50 MG
1 TABLET ORAL
Refills: 0 | DISCHARGE

## 2023-10-06 NOTE — ED ADULT NURSE NOTE - GASTROINTESTINAL ASSESSMENT
Detail Level: Detailed Consent: The patient's consent was obtained including but not limited to risks of crusting, scabbing, blistering, scarring, darker or lighter pigmentary change, recurrence, incomplete removal and infection. Duration Of Freeze Thaw-Cycle (Seconds): 0 Show Applicator Variable?: Yes Render Post-Care Instructions In Note?: no Post-Care Instructions: I reviewed with the patient in detail post-care instructions. Patient is to wear sunprotection, and avoid picking at any of the treated lesions. Pt may apply Vaseline to crusted or scabbing areas. - - -

## 2024-07-16 NOTE — ED ADULT NURSE NOTE - CAS DISCH CONDITION
Patient Name: Aster Craft  : 1946    MRN: [id number]                              Today's Date: 2024       Admit Date: 2024    Visit Dx:     ICD-10-CM ICD-9-CM   1. Chest pain, atypical  R07.89 786.59   2. Acute respiratory failure with hypoxia  J96.01 518.81   3. Hypervolemia, unspecified hypervolemia type  E87.70 276.69     Patient Active Problem List   Diagnosis    Thickened endometrium    Right lower quadrant abdominal pain    Fatty (change of) liver, not elsewhere classified    A-fib    Breast CA    Essential hypertension    GERD (gastroesophageal reflux disease)    Hyperthyroidism    (HFpEF) heart failure with preserved ejection fraction    Acute on chronic heart failure with preserved ejection fraction (HFpEF)    Acute on chronic combined systolic and diastolic CHF (congestive heart failure)    Type 2 diabetes mellitus without complication, without long-term current use of insulin    Chest pain, atypical    Acute on chronic diastolic heart failure     Past Medical History:   Diagnosis Date    Arthritis     Breast cancer     RIGHT BREAST STAGE 3    Diabetes mellitus     Elevated cholesterol     GERD (gastroesophageal reflux disease) 2021    History of cancer chemotherapy     Hypertension     Hyperthyroidism 2022    Kidney stone     Lichen sclerosus     Shingles     Thickened endometrium     Urinary incontinence     Urinary tract infection      Past Surgical History:   Procedure Laterality Date    CARDIAC CATHETERIZATION N/A 7/15/2024    Procedure: Coronary angiography;  Surgeon: Brian Morrow MD;  Location: [facility] CATH INVASIVE LOC[facility];  Service: Cardiology;  Laterality: N/A;    CATARACT EXTRACTION Right 01/10/2023    CERVICAL CONE BIOPSY      CHOLECYSTECTOMY  2010    COLONOSCOPY N/A 2018    Procedure: COLONOSCOPY;  Surgeon: Trenton Lyons MD;  Location: [facility] OR;  Service: Gastroenterology    DILATATION AND CURETTAGE      ENDOSCOPY N/A 2018     Procedure: ESOPHAGOGASTRODUODENOSCOPY;  Surgeon: Trenton Lyons MD;  Location: [facility];  Service: Gastroenterology    MASTECTOMY Right 10/1998    MASTECTOMY Left 07/2013    TUBAL ABDOMINAL LIGATION        General Information       Row Name 07/16/24 1208          Physical Therapy Time and Intention    Document Type therapy note (daily note)  -     Mode of Treatment physical therapy  -       Row Name 07/16/24 1208          General Information    Patient Profile Reviewed yes  -HW     Existing Precautions/Restrictions fall  -       Row Name 07/16/24 1208          Cognition    Orientation Status (Cognition) oriented x 4  -       Row Name 07/16/24 1208          Safety Issues, Functional Mobility    Safety Issues Affecting Function (Mobility) safety precaution awareness;safety precautions follow-through/compliance  -     Impairments Affecting Function (Mobility) balance;endurance/activity tolerance;strength  -               User Key  (r) = Recorded By, (t) = Taken By, (c) = Cosigned By      Initials Name Provider Type     Jo Azevedo PT Physical Therapist                   Mobility       Row Name 07/16/24 1211          Bed Mobility    Supine-Sit [place] (Bed Mobility) not tested  -       Row Name 07/16/24 1211          Sit-Stand Transfer    Sit-Stand [place] (Transfers) contact guard  -     Assistive Device (Sit-Stand Transfers) walker, front-wheeled  -       Row Name 07/16/24 1211          Gait/Stairs (Locomotion)    [place] Level (Gait) contact guard  -     Assistive Device (Gait) walker, front-wheeled  -     Patient was able to Ambulate yes  -HW     Distance in Feet (Gait) 34  -HW     Deviations/Abnormal Patterns (Gait) bilateral deviations;festinating/shuffling;gait speed decreased  -     Bilateral Gait Deviations forward flexed posture;heel strike decreased  -               User Key  (r) = Recorded By, (t) = Taken By, (c) = Cosigned By      Initials Name Provider Type      Jo Azevedo PT Physical Therapist                   Obj/Interventions       Row Name 07/16/24 1213          Motor Skills    Therapeutic Exercise hip;knee;ankle  -       Row Name 07/16/24 1213          Hip (Therapeutic Exercise)    Hip (Therapeutic Exercise) strengthening exercise  -     Hip Strengthening (Therapeutic Exercise) marching while seated;bilateral;20 repititions  -       Row Name 07/16/24 1213          Knee (Therapeutic Exercise)    Knee (Therapeutic Exercise) strengthening exercise  -     Knee Strengthening (Therapeutic Exercise) LAQ (long arc quad);bilateral;10 repetitions  -       Row Name 07/16/24 1213          Ankle (Therapeutic Exercise)    Ankle (Therapeutic Exercise) strengthening exercise  -     Ankle Strengthening (Therapeutic Exercise) dorsiflexion;plantarflexion;10 repetitions;bilateral  -               User Key  (r) = Recorded By, (t) = Taken By, (c) = Cosigned By      Initials Name Provider Type    Jo Barnett PT Physical Therapist                   Goals/Plan    No documentation.                  Clinical Impression       [facility] Name 07/16/24 1214          Pain    Pretreatment Pain Rating 0/10 - no pain  -     Posttreatment Pain Rating 0/10 - no pain  -[facility] Name 07/16/24 1214          Plan of Care Review    Plan of Care Reviewed With patient  -     Progress improving  -     Outcome Evaluation Pt participated in PT treatment session. Pt presents seated in bedside chair, pleasant and agreeable to PT treatment. Pt denies reports of pain at rest. Pt performed STS transfer with RW and CGA for safety. Pt ambulated x34' with RW and CGA for safety. Pt demonstrates increased FF posture, decreased cindi and increased WBing through UE's onto RW. Pt performed seated TE as described in interventions to improve BLE strength and endurance. Pt required increased time and effort to perform exercise d/t fatigue. Following treatment, pt left seated in chair with call light  and all needs within reach. Continue with current POC; progress interventions as tolerated.  -       Row Name 07/16/24 1214          Vital Signs    Pre SpO2 (%) 100  -HW     O2 Delivery Pre Treatment room air  -HW     Intra SpO2 (%) 94  -HW     O2 Delivery Intra Treatment room air  -HW     Post SpO2 (%) 100  -HW     O2 Delivery Post Treatment room air  -HW     Pre Patient Position Sitting  -HW     Intra Patient Position Standing  -HW     Post Patient Position Sitting  -       Row Name 07/16/24 1214          Positioning and Restraints    Pre-Treatment Position sitting in chair/recliner  -HW     Post Treatment Position chair  -HW     In Chair sitting;call light within reach;encouraged to call for assist;notified ns  -               User Key  (r) = Recorded By, (t) = Taken By, (c) = Cosigned By      Initials Name Provider Type    Jo Barnett, PT Physical Therapist                   Outcome Measures       Row Name 07/16/24 1232          How much help from another person do you currently need...    Turning from your back to your side while in flat bed without using bedrails? 4  -HW     Moving from lying on back to sitting on the side of a flat bed without bedrails? 4  -HW     Moving to and from a bed to a chair (including a wheelchair)? 3  -HW     Standing up from a chair using your arms (e.g., wheelchair, bedside chair)? 3  -HW     Climbing 3-5 steps with a railing? 3  -HW     To walk in hospital room? 3  -HW     AM-PAC 6 Clicks Score (PT) 20  -     Highest Level of Mobility Goal 6 --> Walk 10 steps or more  -       Row Name 07/16/24 1232 07/16/24 1011       Functional Assessment    Outcome Measure Options AM-PAC 6 Clicks Basic Mobility (PT)  - AM-PAC 6 Clicks Daily Activity (OT)  -SD              User Key  (r) = Recorded By, (t) = Taken By, (c) = Cosigned By      Initials Name Provider Type    Radha Quintanilla, OT Occupational Therapist    Jo Barnett, PT Physical Therapist                                  Physical Therapy Education       Title: PT OT SLP Therapies (In Progress)       Topic: Physical Therapy (In Progress)       Point: Mobility training (Done)       Learning Progress Summary             Patient Acceptance, E, VU by  at 7/14/2024 1458    Comment: Pt and her great graddaughter educated on benefits of rollator for ambulation   Family Acceptance, E, VU by  at 7/14/2024 1458    Comment: Pt and her great graddaughter educated on benefits of rollator for ambulation                         Point: Home exercise program (Done)       Learning Progress Summary             Patient Acceptance, E,TB, VU by  at 7/16/2024 1232    Comment: Educated pt on importance of performing seated TE                         Point: Body mechanics (Not Started)       Learner Progress:  Not documented in this visit.              Point: Precautions (Not Started)       Learner Progress:  Not documented in this visit.                              User Key       Initials Effective Dates Name Provider Type Discipline     06/16/21 -  Poppy Savage, PT Physical Therapist PT     11/29/23 -  Jo Azevedo PT Physical Therapist PT                  PT Recommendation and Plan     Plan of Care Reviewed With: patient  Progress: improving  Outcome Evaluation: Pt participated in PT treatment session. Pt presents seated in bedside chair, pleasant and agreeable to PT treatment. Pt denies reports of pain at rest. Pt performed STS transfer with RW and CGA for safety. Pt ambulated x34' with RW and CGA for safety. Pt demonstrates increased FF posture, decreased cindi and increased WBing through UE's onto RW. Pt performed seated TE as described in interventions to improve BLE strength and endurance. Pt required increased time and effort to perform exercise d/t fatigue. Following treatment, pt left seated in chair with call light and all needs within reach. Continue with current POC; progress interventions as tolerated.     Time  Calculation:         PT Charges       Row Name 07/16/24 1232             Time Calculation    Start Time 0944  -      Stop Time 1014  -HW      Time Calculation (min) 30 min  -HW      PT Received On 07/16/24  -         Timed Charges    49548 - PT Therapeutic Exercise Minutes 12  -      86904 - Gait Training Minutes  10  -HW      09846 - PT Therapeutic Activity Minutes 8  -HW         Total Minutes    Timed Charges Total Minutes 30  -HW       Total Minutes 30  -HW                User Key  (r) = Recorded By, (t) = Taken By, (c) = Cosigned By      Initials Name Provider Type     Jo Azevedo, BRAULIO Physical Therapist                  Therapy Charges for Today       Code Description Service Date Service Provider Modifiers Qty    86013347965 HC PT THER PROC EA 15 MIN 7/16/2024 Jo Azevedo, PT GP 1    22260627491 HC GAIT TRAINING EA 15 MIN 7/16/2024 Jo Azevedo, PT GP 1            PT G-Codes  Outcome Measure Options: AM-PAC 6 Clicks Basic Mobility (PT)  AM-PAC 6 Clicks Score (PT): 20  AM-PAC 6 Clicks Score (OT): 19       Jo Azevedo PT  7/16/2024     Improved
